# Patient Record
Sex: FEMALE | Employment: OTHER | URBAN - METROPOLITAN AREA
[De-identification: names, ages, dates, MRNs, and addresses within clinical notes are randomized per-mention and may not be internally consistent; named-entity substitution may affect disease eponyms.]

---

## 2019-02-21 ENCOUNTER — OFFICE VISIT (OUTPATIENT)
Dept: CARDIOLOGY CLINIC | Age: 69
End: 2019-02-21

## 2019-02-21 VITALS
HEIGHT: 63 IN | HEART RATE: 76 BPM | RESPIRATION RATE: 16 BRPM | BODY MASS INDEX: 37.6 KG/M2 | DIASTOLIC BLOOD PRESSURE: 80 MMHG | WEIGHT: 212.2 LBS | SYSTOLIC BLOOD PRESSURE: 142 MMHG

## 2019-02-21 DIAGNOSIS — R00.2 HEART PALPITATIONS: ICD-10-CM

## 2019-02-21 DIAGNOSIS — I48.0 PAROXYSMAL A-FIB (HCC): Primary | ICD-10-CM

## 2019-02-21 DIAGNOSIS — R01.1 CARDIAC MURMUR: ICD-10-CM

## 2019-02-21 PROBLEM — E66.01 SEVERE OBESITY (HCC): Status: ACTIVE | Noted: 2019-02-21

## 2019-02-21 RX ORDER — LEVOTHYROXINE SODIUM 100 UG/1
100 TABLET ORAL
COMMUNITY
End: 2019-09-20 | Stop reason: SDUPTHER

## 2019-02-21 RX ORDER — VITAMIN E 268 MG
CAPSULE ORAL DAILY
COMMUNITY

## 2019-02-21 RX ORDER — METOPROLOL SUCCINATE 25 MG/1
TABLET, EXTENDED RELEASE ORAL DAILY
COMMUNITY
End: 2019-02-21 | Stop reason: SDUPTHER

## 2019-02-21 RX ORDER — METOPROLOL SUCCINATE 25 MG/1
25 TABLET, EXTENDED RELEASE ORAL DAILY
Qty: 30 TAB | Refills: 11 | Status: SHIPPED | OUTPATIENT
Start: 2019-02-21 | End: 2019-09-16

## 2019-02-21 RX ORDER — BISMUTH SUBSALICYLATE 262 MG
1 TABLET,CHEWABLE ORAL DAILY
COMMUNITY

## 2019-02-21 RX ORDER — MELATONIN
DAILY
COMMUNITY
End: 2019-09-16

## 2019-02-21 NOTE — LETTER
2/21/2019 5:53 PM 
 
Patient:  Crow Villafuerte YOB: 1950 Date of Visit: 2/21/2019 Dear MD Juan Parr [id number] [facility] [place] [id number] VIA Facsimile: [phone] 
 : 
 
Ms. Adan Ervin is a 76 yo F with family history of early coronary artery disease, second hand tobacco exposure, referred by Dr. Rod Del Rio for cardiac evaluation. She is here due to recently diagnosed atrial fibrillation. On 02/04/2019, she saw her primary care physician because she had been feeling bad and on physical exam noted she had an irregular heart rhythm. Subsequent EKG showed atrial fibrillation. She had been taking Mucinex DM and thinks that may have caused the problem. Since then, she has not had any recurrent palpitations. She denies any prior palpitations. No prior cardiac history. She denies any chest pain, shortness of breath, lightheadedness, dizziness. She was told she had a heart murmur in the past, but notes at times they have said that she did not have one. She had a repeat EKG when she saw her primary care physician on 02/13/2019 that was normal sinus rhythm. She does admit to a lot of hesitancy in taking any type of medication. She is compensated on exam with clear lungs. She does have a I-II/VI systolic murmur at the left upper sternal border and she is in a regular rhythm today. Her blood pressure is 142/80 with a heart rate of 76. Usually, her blood pressure is normal per the patient. Soc hx. No tobacco; second hand tobacco growing up Fam hx. Dad with CAD 62s, emphysema. Assessment and Plan: 1. Paroxysmal atrial fibrillation. She has a lot of hesitancy about taking anything and per symptoms, this may have occurred just once, but talked about the natural history of this that it recurs. Will obtain an echocardiogram.  Recommended taking Toprol 25 mg for rate control.   Her CHADS VASC score is 2 and recommended taking Eliquis for oral anticoagulation. Talked about the benefits and risks and she will think about this. I gave her a prescription for both Toprol and Eliquis. Tentatively, have her follow back in a month to reassess how she is doing and review her echocardiogram.   
2. Family history of early coronary artery disease. 3. History of secondary tobacco exposure. 4. Cardiac murmur. Echocardiogram as noted above. If you have questions, please do not hesitate to call me. Sincerely, Augusta Myers MD

## 2019-02-21 NOTE — PROGRESS NOTES
[place]: [place]  ([phone]    History of Present Illness:   Ms. Jett Flannery is a 76 yo F with family history of early coronary artery disease, second hand tobacco exposure, referred by Dr. Clem Wright for cardiac evaluation. She is here due to recently diagnosed atrial fibrillation. On 02/04/2019, she saw her primary care physician because she had been feeling bad and on physical exam noted she had an irregular heart rhythm. Subsequent EKG showed atrial fibrillation. She had been taking Mucinex DM and thinks that may have caused the problem. Since then, she has not had any recurrent palpitations. She denies any prior palpitations. No prior cardiac history. She denies any chest pain, shortness of breath, lightheadedness, dizziness. She was told she had a heart murmur in the past, but notes at times they have said that she did not have one. She had a repeat EKG when she saw her primary care physician on 02/13/2019 that was normal sinus rhythm. She does admit to a lot of hesitancy in taking any type of medication. She is compensated on exam with clear lungs. She does have a I-II/VI systolic murmur at the left upper sternal border and she is in a regular rhythm today. Her blood pressure is 142/80 with a heart rate of 76. Usually, her blood pressure is normal per the patient. Soc hx. No tobacco; second hand tobacco growing up  Fam hx. Dad with CAD 62s, emphysema. Assessment and Plan:   1. Paroxysmal atrial fibrillation. She has a lot of hesitancy about taking anything and per symptoms, this may have occurred just once, but talked about the natural history of this that it recurs. Will obtain an echocardiogram.  Recommended taking Toprol 25 mg for rate control. Her CHADS VASC score is 2 and recommended taking Eliquis for oral anticoagulation. Talked about the benefits and risks and she will think about this. I gave her a prescription for both Toprol and Eliquis.   Tentatively, have her follow back in a month to reassess how she is doing and review her echocardiogram.    2. Family history of early coronary artery disease. 3. History of secondary tobacco exposure. 4. Cardiac murmur. Echocardiogram as noted above. She  has no past medical history on file. All other systems negative except as above. PE  Vitals:    02/21/19 1142   BP: 142/80   Pulse: 76   Resp: 16   Weight: 212 lb 3.2 oz (96.3 kg)   Height: 5' 3\" (1.6 m)    Body mass index is 37.59 kg/m². General appearance - alert, well appearing, and in no distress  Mental status - affect appropriate to mood  Eyes - sclera anicteric, moist mucous membranes  Neck - supple, no JVD  Chest - clear to auscultation, no wheezes, rales or rhonchi  Heart - normal rate, regular rhythm, normal S1, S2, no murmurs, rubs, clicks or gallops  Abdomen - soft, nontender, nondistended, no masses or organomegaly  Neurological - no focal deficit  Extremities - peripheral pulses normal, no pedal edema      Recent Labs:  No results found for: CHOL, CHOLX, CHLST, CHOLV, 124728, HDL, LDL, LDLC, DLDLP, TGLX, TRIGL, TRIGP, CHHD, CHHDX  No results found for: EMILIA, CREAPOC, ACREA, CREA, REFC3, REFC4  No results found for: BUN, BUNPOC, IBUN, MBUNV, BUNV  No results found for: K, KI, PLK, WBK  No results found for: HBA1C, HGBE8, ZZO1OFRB, RQO5IGTO  No results found for: HGBPOC, HGB, HGBP, HGBEXT, HGBEXT  No results found for: PLT, PLTEXT, PLTEXT    Reviewed:  No past medical history on file. Social History     Tobacco Use   Smoking Status Never Smoker   Smokeless Tobacco Never Used     Social History     Substance and Sexual Activity   Alcohol Use No    Frequency: Never     Allergies   Allergen Reactions    Bactrim [Sulfamethoprim] Swelling    Phenazopyridine Unknown (comments)    Tape [Adhesive] Other (comments)       Current Outpatient Medications   Medication Sig    levothyroxine (SYNTHROID) 100 mcg tablet Take  by mouth Daily (before breakfast).     multivitamin (ONE A DAY) tablet Take 1 Tab by mouth daily.  cholecalciferol (VITAMIN D3) 1,000 unit tablet Take  by mouth daily.  vitamin E (AQUA GEMS) 400 unit capsule Take  by mouth daily.  Lactobacillus acidophilus (PROBIOTIC PO) Take  by mouth daily.  OTHER Folate w/Metafolin L-5: 400mcg  Take one daily on Monday, Thursday and Friday.  OTHER Aloe Vera Juice  6 ounces or more per day. No current facility-administered medications for this visit.         Nancy Syed MD  [facility] heart and V[facility] [place]  [address] [facility] [address]  [address]

## 2019-02-21 NOTE — PROGRESS NOTES
Visit Vitals  /80 (BP 1 Location: Left arm)   Pulse 76   Resp 16   Ht 5' 3\" (1.6 m)   Wt 212 lb 3.2 oz (96.3 kg)   BMI 37.59 kg/m²

## 2019-03-08 ENCOUNTER — TELEPHONE (OUTPATIENT)
Dept: CARDIOLOGY CLINIC | Age: 69
End: 2019-03-08

## 2019-03-08 NOTE — TELEPHONE ENCOUNTER
----- Message from Caryl Jaimes MD sent at 3/2/2019  4:22 PM EST -----  Please let pt know echo was overall ok. Normal EF, mild to moderate dilated left atrium which is often seen with afib. Keep recommendation from office of toprol and anticoagulation, f/u 1 month. Did she decide whether or not to start meds? thx  ----- Message -----  From: Reagan Espinoza MD  Sent: 3/2/2019   4:21 PM  To: Caryl Jaimes MD    [facility] for patient to return call at earliest convenience. I spoke to patient and above echo results given. She has not started medication but she will keep her follow up appt. To discuss further.   2 pt identifiers used    Future Appointments   Date Time Provider Denisse Colin   3/21/2019  9:00 AM Reagan Espinoza  [address]

## 2019-09-16 ENCOUNTER — OFFICE VISIT (OUTPATIENT)
Dept: ENDOCRINOLOGY | Age: 69
End: 2019-09-16

## 2019-09-16 VITALS
HEART RATE: 72 BPM | DIASTOLIC BLOOD PRESSURE: 73 MMHG | BODY MASS INDEX: 36.71 KG/M2 | HEIGHT: 63 IN | WEIGHT: 207.2 LBS | SYSTOLIC BLOOD PRESSURE: 144 MMHG

## 2019-09-16 DIAGNOSIS — E89.0 POST-SURGICAL HYPOTHYROIDISM: Primary | ICD-10-CM

## 2019-09-16 RX ORDER — ERGOCALCIFEROL 1.25 MG/1
50000 CAPSULE ORAL
COMMUNITY
End: 2019-09-16

## 2019-09-16 NOTE — PATIENT INSTRUCTIONS
1) I will repeat your thyroid tests to see if you need a change in your dose of synthroid. I will target a TSH of 0.5-2.0 and your last value was 2.05 in 6/19 which was just over 2 and could mean your dose of synthroid is not enough. 2) Sign up for [employer] using the access code on your sheet. If you want you can download the Flow[employer] brennen from the brennen store (CircleBuilderphone) or Google play store (android) (make sure you allow locations and choose the [facility] portal and choose to receive notifications) so you can communicate with me through the patient portal.  I will send you a message with your lab results. 3) Once we pick a dose, I can send refills to [facility] and we'll repeat your labs in 2 months and prior to visit in 6 months.

## 2019-09-16 NOTE — PROGRESS NOTES
Chief Complaint   Patient presents with    Thyroid Problem     pcp and pharmacy confirmed     History of Present Illness: Whit Gipson is a 77 y.o. female who is a new patient for thyroid referred by Dr. Marely Carrillo. Recalls in her 1980s her GYN telling her that her thyroid was enlarged and was sent to Dr. Helen Huggins and was watched for a year and apparently her thyroid was enlarging so the decision was made to remove it. Thinks her thyroid was overactive prior to the surgery as she was very thin at that time and had lots of energy but was never put on any meds for hyperthyroidism. The pathology was benign and was put on medication after the surgery and was seen by Dr. Michelle Araya and was managed with synthroid the whole time. About 7 years ago was told by her nutrition company to do a trial of natural thyroid and recalls taking armour thyroid and very dry mouth while on this so switched to nature-throid and tolerated this better and stayed on for about 2 years. Recalls being put on cytomel at that point and developed a lot of tremors and then went back to see her PCP, Dr. Arti Santana, and was put back on synthroid 88 mcg daily and then started seeing Dr. Lam President and her dose was increased to 100 mcg daily and has remained on this dose since Nov 2018. TSH was 1.86 in 11/18 and 1.08 in 1/19 and 2.05 in 6/19 and FT4 was 1.46 in 6/19. Last saw the NP in that office in June 2019. Takes the synthroid first thing in the morning with just water and waits at least 30 minutes before eating and coffee. Vitamins are in the afternoon. Never misses any doses and uses a pill box for compliance. Has had more trouble with bloating and was found to have a hiatal hernia and will be seeing a GI doctor tomorrow. Bowels are regular. No hair loss or brittle nails. Does have some dry scalp. Mostly stays comfortable but doesn't like the heat. Overall energy is 6-8/10.   Has irregular sleep patterns due to trying to take care of things around the house when not watching her grandson. Weight was up to almost 300 lbs in  and got down to 170 lbs in  with changing her diet and using some supplements but did get back up to 212 lbs in  and down 5 lbs since then. Past Medical History:   Diagnosis Date    A-fib ([address] 2019    induced by mucinex    Kidney stone     Post-surgical hypothyroidism      Past Surgical History:   Procedure Laterality Date    HX BLEPHAROPLASTY Bilateral     HX BREAST BIOPSY Right     benign    HX BUNIONECTOMY Right     and hammer toe    HX  SECTION      x2 in  and [facility]    HX LITHOTRIPSY  1980s    also had in 2011 x2    HX THYROIDECTOMY      benign nodular goiter    HX TONSIL AND ADENOIDECTOMY      HX TUBAL LIGATION       Current Outpatient Medications   Medication Sig    cholecalciferol, vitamin D3, (VITAMIN D3 PO) Take 10,000 Int'l Units by mouth daily.  ascorbic acid (VITAMIN C PO) Take 500 mg by mouth daily.  ZINC PICOLINATE PO Take 50 mg by mouth daily.  levothyroxine (SYNTHROID) 100 mcg tablet Take 100 mcg by mouth Daily (before breakfast). BRAND MEDICALLY NECESSARY    multivitamin (ONE A DAY) tablet Take 1 Tab by mouth daily.  vitamin E (AQUA GEMS) 400 unit capsule Take  by mouth daily.  OTHER daily. Folate w/Metafolin L-5: 400mcg    OTHER Aloe Vera Juice  6 ounces or more per day. No current facility-administered medications for this visit.       Allergies   Allergen Reactions    Bactrim [Sulfamethoprim] Swelling    Mucinex [Guaifenesin] Other (comments)     Put her into A-fib    Phenazopyridine Unknown (comments)    Tape [Adhesive] Other (comments)     Family History   Problem Relation Age of Onset   [facility] COPD Mother     Stroke Mother         and needed brain surgery    COPD Father     Heart Disease Father         MI and stents    Cancer Father         bone    Thyroid Disease Sister benign nodular goiter removed    Thyroid Cancer Neg Hx      Social History     Socioeconomic History    Marital status:      Spouse name: Not on file    Number of children: Not on file    Years of education: Not on file    Highest education level: Not on file   Occupational History    Not on file   Social Needs    Financial resource strain: Not on file    Food insecurity:     Worry: Not on file     Inability: Not on file    Transportation needs:     Medical: Not on file     Non-medical: Not on file   Tobacco Use    Smoking status: Never Smoker    Smokeless tobacco: Never Used   Substance and Sexual Activity    Alcohol use: No     Frequency: Never    Drug use: Never    Sexual activity: Not on file   Lifestyle    Physical activity:     Days per week: Not on file     Minutes per session: Not on file    Stress: Not on file   Relationships    Social connections:     Talks on phone: Not on file     Gets together: Not on file     Attends Sabianism service: Not on file     Active member of club or organization: Not on file     Attends meetings of clubs or organizations: Not on file     Relationship status: Not on file    Intimate partner violence:     Fear of current or ex partner: Not on file     Emotionally abused: Not on file     Physically abused: Not on file     Forced sexual activity: Not on file   Other Topics Concern    Not on file   Social History Narrative    Lives in [place] with  of 46 years. Has one son age 39 and one daughter age 39. 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 Likes to be with her friends.        Review of Systems:  - Constitutional Symptoms: no fevers, chills, (+) weight change as above  - Eyes: no blurry vision or double vision  - Cardiovascular: no chest pain or palpitations  - Respiratory: no cough or shortness of breath  - Gastrointestinal: no dysphagia or abdominal pain  - Musculoskeletal: no joint pains or weakness  - Integumentary: no rashes  - Neurological: no numbness, tingling, or headaches  - Psychiatric: no depression or anxiety  - Endocrine: no heat or cold intolerance, no polyuria or polydipsia    Physical Examination:  Blood pressure 144/73, pulse 72, height 5' 3\" (1.6 m), weight 207 lb 3.2 oz (94 kg). - General: pleasant, no distress, good eye contact  - HEENT: no exopthalmos, no periorbital edema, no scleral/conjunctival injection, EOMI, no lid lag or stare  - Neck: supple, well healed inferior neck scar, no palpable thyroid tissue, masses, lymph nodes, or carotid bruits, no supraclavicular or dorsocervical fat pads  - Cardiovascular: regular, normal rate, normal S1 and S2, no murmurs/rubs/gallops   - Respiratory: clear to auscultation bilaterally  - Gastrointestinal: soft, nontender, nondistended, no masses, no hepatosplenomegaly  - Musculoskeletal: no proximal muscle weakness in upper or lower extremities  - Integumentary: no acanthosis nigricans, no abdominal striae, no rashes, no edema  - Neurological: reflexes 2+ at biceps, very mild tremor  - Psychiatric: normal mood and affect    Data Reviewed:   - .labs as above      Assessment/Plan:   1. Post-surgical hypothyroidism: Recalls in her 1980s her GYN telling her that her thyroid was enlarged and was sent to Dr. Georgie Taveras and was watched for a year and apparently her thyroid was enlarging so the decision was made to remove it. Thinks her thyroid was overactive prior to the surgery as she was very thin at that time and had lots of energy but was never put on any meds for hyperthyroidism. The pathology was benign and was put on medication after the surgery and was seen by Dr. Delroy Felix and was managed with synthroid the whole time.   About 7 years ago was told by her nutrition company to do a trial of natural thyroid and recalls taking armour thyroid and very dry mouth while on this so switched to nature-throid and tolerated this better and stayed on for about 2 years. Recalls being put on cytomel at that point and developed a lot of tremors and then went back to see her PCP, Dr. Cheyenne Bustos, and was put back on synthroid 88 mcg daily and then started seeing Dr. Anibal Ramirez and her dose was increased to 100 mcg daily and has remained on this dose since Nov 2018. TSH was 1.86 in 11/18 and 1.08 in 1/19 and 2.05 in 6/19 and FT4 was 1.46 in 6/19. Will target a TSH of 0.5-2.0 and see if she needs a slight dose increase. - cont synthroid 100 mcg daily until labs are back  - check TSH and free T4 today, in 2 months and prior to next visit        Patient Instructions   1) I will repeat your thyroid tests to see if you need a change in your dose of synthroid. I will target a TSH of 0.5-2.0 and your last value was 2.05 in 6/19 which was just over 2 and could mean your dose of synthroid is not enough. 2) Sign up for [employer] using the access code on your sheet. If you want you can download the [employer] brennen from the brennen store (I-phone) or Google play store (android) (make sure you allow locations and choose the [facility] [employer] portal and choose to receive notifications) so you can communicate with me through the patient portal.  I will send you a message with your lab results. 3) Once we pick a dose, I can send refills to [facility] and we'll repeat your labs in 2 months and prior to visit in 6 months. Follow-up and Dispositions    · Return in about 6 months (around 3/16/2020). Copy sent to:   Yoav Hopkins MD as PCP - General (Family Practice)  Kimberly Denton MD (Obstetrics & Gynecology)    Lab follow up: 9/20/19    Sent her the following message in a letter and had Van call her with her lab results:    Resulted Orders   T4, FREE (Collected: 9/16/2019 12:09 PM)   Result Value Ref Range    T4, Free 1.63 0.82 - 1.77 ng/dL    Narrative    Performed at:  [address]  [id number]  : Biju Lin MD, Phone:  [phone]   TSH 3RD GENERATION (Collected: 9/16/2019 12:09 PM)   Result Value Ref Range    TSH 1.660 0.450 - 4.500 uIU/mL    Narrative    Performed at:  [address]  [id number]  : Frances Castorena MD, Phone:  [phone]       TSH is a thyroid test.  Your level is 1.66 which is normal and at goal of 0.5-2.0. This test goes opposite of your thyroid dose and suggests your dose of synthroid is perfect so I will keep your dose the same and send a year of refills to [facility] ([facility]throid direct in [place]). Plan on repeating your labs in 2 months to make sure everything still looks good and again prior to your visit in 6 months. Lab follow up: 11/21/19    Sent her the following message in a letter:    Resulted Orders   T4, FREE (Collected: 11/20/2019  8:47 AM)   Result Value Ref Range    T4, Free 1.54 0.82 - 1.77 ng/dL    Narrative    Performed at:  [address]  [id number]  : Frances Castorena MD, Phone:  [phone]   TSH 3RD GENERATION (Collected: 11/20/2019  8:47 AM)   Result Value Ref Range    TSH 2.150 0.450 - 4.500 uIU/mL    Narrative    Performed at:  [address]  [id number]  : Frances Castorena MD, Phone:  [phone]       TSH is a thyroid test.  Your level is 2.15 which is normal but just above goal of 0.5-2.0. This test goes opposite of your thyroid dose and suggests your dose of synthroid is likely adequate but possibly not enough or you have missed doses in the past 6 weeks or are not taking it properly. Please confirm if you have missed any doses or not in the past 6 weeks. Are you taking on an empty stomach with just water at least 30 min before food and coffee? Are you taking any vitamins within 4 hours of your pill? Are you on any new acid reflux medications or any other new medications?   If you are not taking it properly or have missed doses, then I recommend we focus on proper technique and compliance and not change the dose. As long as you are taking properly and haven't missed any doses and don't have any interfering meds and are feeling well, we can keep the dose the same. However, if are feeling more tired despite taking it properly and not missing doses, then we could try taking 1 tab on Mon-Sat and 1.5 tabs on Sunday to get your TSH back under 2. Let me know if you feel a dose change is needed or not. Addendum: 12/2/19  She called our office back to state she does want to try the 7.5 tabs/week dose so have updated her med list to reflect this change.

## 2019-09-17 LAB
T4 FREE SERPL-MCNC: 1.63 NG/DL (ref 0.82–1.77)
TSH SERPL DL<=0.005 MIU/L-ACNC: 1.66 UIU/ML (ref 0.45–4.5)

## 2019-09-20 ENCOUNTER — TELEPHONE (OUTPATIENT)
Dept: ENDOCRINOLOGY | Age: 69
End: 2019-09-20

## 2019-09-20 RX ORDER — LEVOTHYROXINE SODIUM 100 UG/1
100 TABLET ORAL
Qty: 90 TAB | Refills: 3 | Status: SHIPPED | OUTPATIENT
Start: 2019-09-20 | End: 2019-12-02

## 2019-09-20 NOTE — TELEPHONE ENCOUNTER
----- Message from Jayro Guillen sent at 9/20/2019  1:26 PM EDT -----  Regarding: Dr Jamie Couch Message/    Caller's first and last name:      Reason for call see if lab tests results are back      Callback required yes/no and why:get lab test results       Best contact number(s):[phone]      Details to clarify the request:  Pt would like to know if her lab tests results are back yet from her 9/16/19 visit,, in case she needs to change her mediation     Jayro Guillen

## 2019-09-20 NOTE — TELEPHONE ENCOUNTER
Please let her know Im away at a conference until Monday and will be reviewing her results over the weekend and will be in touch by Monday.

## 2019-09-21 NOTE — TELEPHONE ENCOUNTER
Please call her with her lab results and let her know I sent her the following message in a lab letter:    TSH is a thyroid test.  Your level is 1.66 which is normal and at goal of 0.5-2.0. This test goes opposite of your thyroid dose and suggests your dose of synthroid is perfect so I will keep your dose the same and send a year of refills to [facility] ([facility] direct in [place]). Plan on repeating your labs in 2 months to make sure everything still looks good and again prior to your visit in 6 months.

## 2019-10-23 ENCOUNTER — HOSPITAL ENCOUNTER (OUTPATIENT)
Age: 69
Setting detail: OUTPATIENT SURGERY
Discharge: HOME | End: 2019-10-23
Attending: INTERNAL MEDICINE | Admitting: INTERNAL MEDICINE
Payer: MEDICARE

## 2019-10-23 ENCOUNTER — ANESTHESIA EVENT (OUTPATIENT)
Dept: ENDOSCOPY | Age: 69
End: 2019-10-23
Payer: MEDICARE

## 2019-10-23 ENCOUNTER — ANESTHESIA (OUTPATIENT)
Dept: ENDOSCOPY | Age: 69
End: 2019-10-23
Payer: MEDICARE

## 2019-10-23 VITALS
TEMPERATURE: 97.8 F | HEIGHT: 62 IN | HEART RATE: 68 BPM | SYSTOLIC BLOOD PRESSURE: 140 MMHG | BODY MASS INDEX: 34.96 KG/M2 | DIASTOLIC BLOOD PRESSURE: 74 MMHG | RESPIRATION RATE: 15 BRPM | WEIGHT: 190 LBS | OXYGEN SATURATION: 100 %

## 2019-10-23 PROCEDURE — 77030039825 HC MSK NSL PAP SUPERNO2VA VYRM -B: Performed by: ANESTHESIOLOGY

## 2019-10-23 PROCEDURE — 76060000031 HC ANESTHESIA FIRST 0.5 HR: Performed by: INTERNAL MEDICINE

## 2019-10-23 PROCEDURE — 77030021593 HC FCPS BIOP ENDOSC BSC -A: Performed by: INTERNAL MEDICINE

## 2019-10-23 PROCEDURE — 77030013992 HC SNR POLYP ENDOSC BSC -B: Performed by: INTERNAL MEDICINE

## 2019-10-23 PROCEDURE — C1726 CATH, BAL DIL, NON-VASCULAR: HCPCS | Performed by: INTERNAL MEDICINE

## 2019-10-23 PROCEDURE — 74011000250 HC RX REV CODE- 250: Performed by: NURSE ANESTHETIST, CERTIFIED REGISTERED

## 2019-10-23 PROCEDURE — 76040000019: Performed by: INTERNAL MEDICINE

## 2019-10-23 PROCEDURE — 88305 TISSUE EXAM BY PATHOLOGIST: CPT

## 2019-10-23 PROCEDURE — 77030018712 HC DEV BLLN INFL BSC -B: Performed by: INTERNAL MEDICINE

## 2019-10-23 PROCEDURE — 74011250636 HC RX REV CODE- 250/636: Performed by: NURSE ANESTHETIST, CERTIFIED REGISTERED

## 2019-10-23 RX ORDER — SODIUM CHLORIDE 9 MG/ML
50 INJECTION, SOLUTION INTRAVENOUS CONTINUOUS
Status: DISCONTINUED | OUTPATIENT
Start: 2019-10-23 | End: 2019-10-23 | Stop reason: HOSPADM

## 2019-10-23 RX ORDER — SODIUM CHLORIDE 0.9 % (FLUSH) 0.9 %
5-40 SYRINGE (ML) INJECTION AS NEEDED
Status: DISCONTINUED | OUTPATIENT
Start: 2019-10-23 | End: 2019-10-23 | Stop reason: HOSPADM

## 2019-10-23 RX ORDER — LIDOCAINE HYDROCHLORIDE 20 MG/ML
INJECTION, SOLUTION EPIDURAL; INFILTRATION; INTRACAUDAL; PERINEURAL AS NEEDED
Status: DISCONTINUED | OUTPATIENT
Start: 2019-10-23 | End: 2019-10-23 | Stop reason: HOSPADM

## 2019-10-23 RX ORDER — NALOXONE HYDROCHLORIDE 0.4 MG/ML
0.4 INJECTION, SOLUTION INTRAMUSCULAR; INTRAVENOUS; SUBCUTANEOUS
Status: DISCONTINUED | OUTPATIENT
Start: 2019-10-23 | End: 2019-10-23 | Stop reason: HOSPADM

## 2019-10-23 RX ORDER — FLUMAZENIL 0.1 MG/ML
0.2 INJECTION INTRAVENOUS
Status: DISCONTINUED | OUTPATIENT
Start: 2019-10-23 | End: 2019-10-23 | Stop reason: HOSPADM

## 2019-10-23 RX ORDER — SODIUM CHLORIDE 0.9 % (FLUSH) 0.9 %
5-40 SYRINGE (ML) INJECTION EVERY 8 HOURS
Status: DISCONTINUED | OUTPATIENT
Start: 2019-10-23 | End: 2019-10-23 | Stop reason: HOSPADM

## 2019-10-23 RX ORDER — PROPOFOL 10 MG/ML
INJECTION, EMULSION INTRAVENOUS AS NEEDED
Status: DISCONTINUED | OUTPATIENT
Start: 2019-10-23 | End: 2019-10-23 | Stop reason: HOSPADM

## 2019-10-23 RX ORDER — SODIUM CHLORIDE 9 MG/ML
INJECTION, SOLUTION INTRAVENOUS
Status: DISCONTINUED | OUTPATIENT
Start: 2019-10-23 | End: 2019-10-23 | Stop reason: HOSPADM

## 2019-10-23 RX ORDER — ATROPINE SULFATE 0.1 MG/ML
0.5 INJECTION INTRAVENOUS
Status: DISCONTINUED | OUTPATIENT
Start: 2019-10-23 | End: 2019-10-23 | Stop reason: HOSPADM

## 2019-10-23 RX ORDER — DEXTROMETHORPHAN/PSEUDOEPHED 2.5-7.5/.8
1.2 DROPS ORAL
Status: DISCONTINUED | OUTPATIENT
Start: 2019-10-23 | End: 2019-10-23 | Stop reason: HOSPADM

## 2019-10-23 RX ORDER — EPINEPHRINE 0.1 MG/ML
1 INJECTION INTRACARDIAC; INTRAVENOUS
Status: DISCONTINUED | OUTPATIENT
Start: 2019-10-23 | End: 2019-10-23 | Stop reason: HOSPADM

## 2019-10-23 RX ADMIN — PROPOFOL 50 MG: 10 INJECTION, EMULSION INTRAVENOUS at 09:28

## 2019-10-23 RX ADMIN — PROPOFOL 50 MG: 10 INJECTION, EMULSION INTRAVENOUS at 09:31

## 2019-10-23 RX ADMIN — PROPOFOL 50 MG: 10 INJECTION, EMULSION INTRAVENOUS at 09:19

## 2019-10-23 RX ADMIN — PROPOFOL 100 MG: 10 INJECTION, EMULSION INTRAVENOUS at 09:15

## 2019-10-23 RX ADMIN — PROPOFOL 50 MG: 10 INJECTION, EMULSION INTRAVENOUS at 09:25

## 2019-10-23 RX ADMIN — PROPOFOL 50 MG: 10 INJECTION, EMULSION INTRAVENOUS at 09:22

## 2019-10-23 RX ADMIN — SODIUM CHLORIDE: 900 INJECTION, SOLUTION INTRAVENOUS at 09:11

## 2019-10-23 RX ADMIN — LIDOCAINE HYDROCHLORIDE 40 MG: 20 INJECTION, SOLUTION EPIDURAL; INFILTRATION; INTRACAUDAL; PERINEURAL at 09:15

## 2019-10-23 RX ADMIN — PROPOFOL 50 MG: 10 INJECTION, EMULSION INTRAVENOUS at 09:16

## 2019-10-23 NOTE — ANESTHESIA PREPROCEDURE EVALUATION
Relevant Problems   No relevant active problems       Anesthetic History   No history of anesthetic complications            Review of Systems / Medical History  Patient summary reviewed, nursing notes reviewed and pertinent labs reviewed    Pulmonary        Sleep apnea: No treatment  Undiagnosed apnea         Neuro/Psych   Within defined limits           Cardiovascular  Within defined limits                Exercise tolerance: >4 METS     GI/Hepatic/Renal  Within defined limits              Endo/Other      Hypothyroidism: well controlled  Morbid obesity     Other Findings              Physical Exam    Airway  Mallampati: II  TM Distance: > 6 cm  Neck ROM: normal range of motion   Mouth opening: Normal     Cardiovascular  Regular rate and rhythm,  S1 and S2 normal,  no murmur, click, rub, or gallop             Dental  No notable dental hx       Pulmonary  Breath sounds clear to auscultation               Abdominal  GI exam deferred       Other Findings            Anesthetic Plan    ASA: 2  Anesthesia type: MAC          Induction: Intravenous  Anesthetic plan and risks discussed with: Patient

## 2019-10-23 NOTE — DISCHARGE INSTRUCTIONS
118 [facility].  217 [facility] 7300 [facility], [address]  [address] [place] [address],[address]  [id number]  1950    DISCOMFORT:  Redness at IV site- apply warm compress to area; if redness or soreness persist- contact your physician  There may be a slight amount of blood passed from the rectum  Gaseous discomfort- walking, belching will help relieve any discomfort    DIET:   High fiber diet. GERD diet: avoid fried and fatty foods. peppermint, chocolate, alcohol, coffee, citrus fruits and juices, tomoato products; avoid lying down for 2 to 3 hours after eating.   - however -  remember your colon is empty and a heavy meal will produce gas. Avoid these foods:  vegetables, fried / greasy foods, carbonated drinks for today      ACTIVITY  Spend the remainder of the day resting -  avoid any strenuous activity, then you may resume your normal daily activities   You may not operate a vehicle for 12 hours  You may not  engage in an occupation involving machinery or appliances for rest of today  You may not  drink alcoholic beverages for at least 12 hours  Avoid making any critical decisions for at least 24 hour    CALL M.D. ANY SIGN OF   Increasing pain, nausea, vomiting  Abdominal distension (swelling)  New increased bleeding (oral or rectal)  Fever (chills)  Pain in chest area  Bloody discharge from nose or mouth  Shortness of breath    You may not  take any Advil, Aspirin, Ibuprofen, Motrin, Aleve, or Goodys for 7 days, ONLY  Tylenol as needed for pain. Post procedure diagnosis: 1. hiatal hernia  2. schatzk's ring  3. splenic flexure polyp  4.sigmoid colon polyp  5. divertulosis  6.  internal hemorrhoids  7. melanosis    Patient Education   Patient Education   Patient Education   Patient Education   Patient Education        Hiatal Hernia: Care Instructions  Your Care Instructions  A hiatal hernia occurs when part of the stomach bulges into the chest cavity. A hiatal hernia may allow stomach acid and juices to back up into the esophagus (acid reflux). This can cause a feeling of burning, warmth, heat, or pain behind the breastbone. This feeling may often occur after you eat, soon after you lie down, or when you bend forward, and it may come and go. You also may have a sour taste in your mouth. These symptoms are commonly known as heartburn or reflux. But not all hiatal hernias cause symptoms. Follow-up care is a key part of your treatment and safety. Be sure to make and go to all appointments, and call your doctor if you are having problems. It's also a good idea to know your test results and keep a list of the medicines you take. How can you care for yourself at home? · Take your medicines exactly as prescribed. Call your doctor if you think you are having a problem with your medicine. · Do not take aspirin or other nonsteroidal anti-inflammatory drugs (NSAIDs), such as ibuprofen (Advil, Motrin) or naproxen (Aleve), unless your doctor says it is okay. Ask your doctor what you can take for pain. · Your doctor may recommend over-the-counter medicine. For mild or occasional indigestion, antacids such as Tums, Gaviscon, Maalox, or Mylanta may help. Your doctor also may recommend over-the-counter acid reducers, such as famotidine (Pepcid AC), cimetidine (Tagamet HB), ranitidine (Zantac 75 and Zantac 150), or omeprazole (Prilosec). Read and follow all instructions on the label. If you use these medicines often, talk with your doctor. · Change your eating habits. ? It's best to eat several small meals instead of two or three large meals. ? After you eat, wait 2 to 3 hours before you lie down. Late-night snacks aren't a good idea. ? Chocolate, mint, and alcohol can make heartburn worse. They relax the valve between the esophagus and the stomach. ?  Spicy foods, foods that have a lot of acid (like tomatoes and oranges), and coffee can make heartburn symptoms worse in some people. If your symptoms are worse after you eat a certain food, you may want to stop eating that food to see if your symptoms get better. · Do not smoke or chew tobacco.  · If you get heartburn at night, raise the head of your bed 6 to 8 inches by putting the frame on blocks or placing a foam wedge under the head of your mattress. (Adding extra pillows does not work.)  · Do not wear tight clothing around your middle. · Lose weight if you need to. Losing just 5 to 10 pounds can help. When should you call for help? Call your doctor now or seek immediate medical care if:    · You have new or worse belly pain.     · You are vomiting.    Watch closely for changes in your health, and be sure to contact your doctor if:    · You have new or worse symptoms of indigestion.     · You have trouble or pain swallowing.     · You are losing weight.     · You do not get better as expected. Where can you learn more? Go to http://shanika-tejas.info/. Enter A564 in the search box to learn more about \"Hiatal Hernia: Care Instructions. \"  Current as of: November 7, 2018  Content Version: 12.2  © 3427-3973 [employer]. Care instructions adapted under license by [employer] (which disclaims liability or warranty for this information). If you have questions about a medical condition or this instruction, always ask your healthcare professional. [place] [address] any warranty or liability for your use of this information. Diverticulosis: Care Instructions  Your Care Instructions  In diverticulosis, pouches called diverticula form in the wall of the large intestine (colon). The pouches do not cause any pain or other symptoms. Most people who have diverticulosis do not know they have it. But the pouches sometimes bleed, and if they become infected, they can cause pain and other symptoms.  When this happens, it is called diverticulitis. Diverticula form when pressure pushes the wall of the colon outward at certain weak points. A diet that is too low in fiber can cause diverticula. Follow-up care is a key part of your treatment and safety. Be sure to make and go to all appointments, and call your doctor if you are having problems. It's also a good idea to know your test results and keep a list of the medicines you take. How can you care for yourself at home? · Include fruits, leafy green vegetables, beans, and whole grains in your diet each day. These foods are high in fiber. · Take a fiber supplement, such as Citrucel or Metamucil, every day if needed. Read and follow all instructions on the label. · Drink plenty of fluids, enough so that your urine is light yellow or clear like water. If you have kidney, heart, or liver disease and have to limit fluids, talk with your doctor before you increase the amount of fluids you drink. · Get at least 30 minutes of exercise on most days of the week. Walking is a good choice. You also may want to do other activities, such as running, swimming, cycling, or playing tennis or team sports. · Cut out foods that cause gas, pain, or other symptoms. When should you call for help? Call your doctor now or seek immediate medical care if:    · You have belly pain.     · You pass maroon or very bloody stools.     · You have a fever.     · You have nausea and vomiting.     · You have unusual changes in your bowel movements or abdominal swelling.     · You have burning pain when you urinate.     · You have abnormal vaginal discharge.     · You have shoulder pain.     · You have cramping pain that does not get better when you have a bowel movement or pass gas.     · You pass gas or stool from your urethra while urinating.    Watch closely for changes in your health, and be sure to contact your doctor if you have any problems. Where can you learn more?   Go to http://shanika-tejas.info/. Enter R734 in the search box to learn more about \"Diverticulosis: Care Instructions. \"  Current as of: November 7, 2018  Content Version: 12.2  © 4937-3812 [employer]. Care instructions adapted under license by [employer] (which disclaims liability or warranty for this information). If you have questions about a medical condition or this instruction, always ask your healthcare professional. [address] any warranty or liability for your use of this information. Hemorrhoids: Care Instructions  Your Care Instructions    Hemorrhoids are enlarged veins that develop in the anal canal. Bleeding during bowel movements, itching, swelling, and rectal pain are the most common symptoms. They can be uncomfortable at times, but hemorrhoids rarely are a serious problem. You can treat most hemorrhoids with simple changes to your diet and bowel habits. These changes include eating more fiber and not straining to pass stools. Most hemorrhoids do not need surgery or other treatment unless they are very large and painful or bleed a lot. Follow-up care is a key part of your treatment and safety. Be sure to make and go to all appointments, and call your doctor if you are having problems. It's also a good idea to know your test results and keep a list of the medicines you take. How can you care for yourself at home? · Sit in a few inches of warm water (sitz bath) 3 times a day and after bowel movements. The warm water helps with pain and itching. · Put ice on your anal area several times a day for 10 minutes at a time. Put a thin cloth between the ice and your skin. Follow this by placing a warm, wet towel on the area for another 10 to 20 minutes. · Take pain medicines exactly as directed. ? If the doctor gave you a prescription medicine for pain, take it as prescribed.   ? If you are not taking a prescription pain medicine, ask your doctor if you can take an over-the-counter medicine. · Keep the anal area clean, but be gentle. Use water and a fragrance-free soap, such as [place], or use baby wipes or medicated pads, such as Tucks. · Wear cotton underwear and loose clothing to decrease moisture in the anal area. · Eat more fiber. Include foods such as whole-grain breads and cereals, raw vegetables, raw and dried fruits, and beans. · Drink plenty of fluids, enough so that your urine is light yellow or clear like water. If you have kidney, heart, or liver disease and have to limit fluids, talk with your doctor before you increase the amount of fluids you drink. · Use a stool softener that contains bran or psyllium. You can save money by buying bran or psyllium (available in bulk at most health food stores) and sprinkling it on foods or stirring it into fruit juice. Or you can use a product such as Metamucil or Hydrocil. · Practice healthy bowel habits. ? Go to the bathroom as soon as you have the urge. ? Avoid straining to pass stools. Relax and give yourself time to let things happen naturally. ? Do not hold your breath while passing stools. ? Do not read while sitting on the toilet. Get off the toilet as soon as you have finished. · Take your medicines exactly as prescribed. Call your doctor if you think you are having a problem with your medicine. When should you call for help? Call 911 anytime you think you may need emergency care. For example, call if:    · You pass maroon or very bloody stools.    Call your doctor now or seek immediate medical care if:    · You have increased pain.     · You have increased bleeding.    Watch closely for changes in your health, and be sure to contact your doctor if:    · Your symptoms have not improved after 3 or 4 days. Where can you learn more? Go to http://shanika-tejas.info/.   Enter F228 in the search box to learn more about \"Hemorrhoids: Care Instructions. \"  Current as of: November 7, 2018  Content Version: 12.2  © 0792-2104 [employer]. Care instructions adapted under license by [employer] (which disclaims liability or warranty for this information). If you have questions about a medical condition or this instruction, always ask your healthcare professional. [address] any warranty or liability for your use of this information. Colon Polyps: Care Instructions  Your Care Instructions    Colon polyps are growths in the colon or the rectum. The cause of most colon polyps is not known, and most people who get them do not have any problems. But a certain kind can turn into cancer. For this reason, regular testing for colon polyps is important for people as they get older. It is also important for anyone who has an increased risk for colon cancer. Polyps are usually found through routine colon cancer screening tests. Although most colon polyps are not cancerous, they are usually removed and then tested for cancer. Screening for colon cancer saves lives because the cancer can usually be cured if it is caught early. If you have a polyp that is the type that can turn into cancer, you may need more tests to examine your entire colon. The doctor will remove any other polyps that he or she finds, and you will be tested more often. Follow-up care is a key part of your treatment and safety. Be sure to make and go to all appointments, and call your doctor if you are having problems. It's also a good idea to know your test results and keep a list of the medicines you take. How can you care for yourself at home? Regular exams to look for colon polyps are the best way to prevent polyps from turning into colon cancer. These can include stool tests, sigmoidoscopy, colonoscopy, and CT colonography. Talk with your doctor about a testing schedule that is right for you.   To prevent polyps  There is no home treatment that can prevent colon polyps. But these steps may help lower your risk for cancer. · Stay active. Being active can help you get to and stay at a healthy weight. Try to exercise on most days of the week. Walking is a good choice. · Eat well. Choose a variety of vegetables, fruits, legumes (such as peas and beans), fish, poultry, and whole grains. · Do not smoke. If you need help quitting, talk to your doctor about stop-smoking programs and medicines. These can increase your chances of quitting for good. · If you drink alcohol, limit how much you drink. Limit alcohol to 2 drinks a day for men and 1 drink a day for women. When should you call for help? Call your doctor now or seek immediate medical care if:    · You have severe belly pain.     · Your stools are maroon or very bloody.    Watch closely for changes in your health, and be sure to contact your doctor if:    · You have a fever.     · You have nausea or vomiting.     · You have a change in bowel habits (new constipation or diarrhea).     · Your symptoms get worse or are not improving as expected. Where can you learn more? Go to http://shanika-tejas.info/. Enter 95 458289 in the search box to learn more about \"Colon Polyps: Care Instructions. \"  Current as of: December 19, 2018  Content Version: 12.2  © 6208-1010 [employer]. Care instructions adapted under license by [employer] (which disclaims liability or warranty for this information). If you have questions about a medical condition or this instruction, always ask your healthcare professional. [place] [address] any warranty or liability for your use of this information. Esophageal Dilation: What to Expect at [address] [place] [address]  After you have esophageal dilation, you will stay at the hospital or surgery center for 1 to 2 hours. This will allow the medicine to wear off.  You will be able to go home after your doctor or nurse checks to make sure you are not having any problems. This care sheet gives you a general idea about how long it will take for you to recover. But each person recovers at a different pace. Follow the steps below to get better as quickly as possible. How can you care for yourself at home? Activity    · Rest as much as you need to after you go home.     · You should be able to go back to your usual activities the day after the procedure. Diet    · Follow your doctor's directions for eating after the procedure.     · Drink plenty of fluids (unless your doctor has told you not to). Medicines    · Your doctor will tell you if and when you can restart your medicines. He or she will also give you instructions about taking any new medicines.     · If you take blood thinners, such as warfarin (Coumadin), clopidogrel (Plavix), or aspirin, be sure to talk to your doctor. He or she will tell you if and when to start taking those medicines again. Make sure that you understand exactly what your doctor wants you to do.     · If you have a sore throat the day after the procedure, use an over-the-counter spray to numb your throat. Sucking on throat lozenges and gargling with warm salt water may also help relieve your symptoms. Follow-up care is a key part of your treatment and safety. Be sure to make and go to all appointments, and call your doctor if you are having problems. It's also a good idea to know your test results and keep a list of the medicines you take. When should you call for help? Call 911 anytime you think you may need emergency care. For example, call if:    · You passed out (lost consciousness).     · You have trouble breathing.     · Your stools are maroon or very bloody    Call your doctor now or seek immediate medical care if:    · You have new or worse belly pain.     · You have a fever.     · You have new or more blood in your stools.     · You are sick to your stomach and cannot drink fluids.   · You cannot pass stools or gas.     · You have pain that does not get better after you take pain medicine.    Watch closely for changes in your health, and be sure to contact your doctor if:    · Your throat still hurts after a day or two.     · You do not get better as expected. Where can you learn more? Go to http://shanika-tejsa.info/. Enter T411 in the search box to learn more about \"Esophageal Dilation: What to Expect at Home. \"  Current as of: November 7, 2018  Content Version: 12.2  © 0512-1663 [employer]. Care instructions adapted under license by [employer] (which disclaims liability or warranty for this information). If you have questions about a medical condition or this instruction, always ask your healthcare professional. [address] any warranty or liability for your use of this information. Follow-up Instructions:   Call Dr. Pastor Moya for any questions or problems. we took a biopsy please call the office within 2 weeks to discuss your pathology results.  Telephone # [phone]

## 2019-10-23 NOTE — PROGRESS NOTES

## 2019-10-23 NOTE — H&P
[address] [facility] [address]  217 [facility] 140 [facility], [address]  [phone]                                History and Physical     NAME: Savannah Dempsey   :  1950   MRN:  [id number]     HPI:  The patient was seen and examined. Past Surgical History:   Procedure Laterality Date    HX BLEPHAROPLASTY Bilateral     HX BREAST BIOPSY Right     benign    HX BUNIONECTOMY Right     and hammer toe    HX  SECTION      x2 in  and [facility]    HX LITHOTRIPSY  1980s    also had in 2011 x2    HX THYROIDECTOMY      benign nodular goiter    HX TONSIL AND ADENOIDECTOMY      HX TUBAL LIGATION       Past Medical History:   Diagnosis Date    A-fib ([address] 2019    induced by mucinex    Kidney stone     Post-surgical hypothyroidism      Social History     Tobacco Use    Smoking status: Never Smoker    Smokeless tobacco: Never Used   Substance Use Topics    Alcohol use: No     Frequency: Never    Drug use: Never     Allergies   Allergen Reactions    Bactrim [Sulfamethoprim] Swelling    Mucinex [Guaifenesin] Other (comments)     Put her into A-fib    Phenazopyridine Unknown (comments)    Tape [Adhesive] Other (comments)     Family History   Problem Relation Age of Onset   [facility] COPD Mother     Stroke Mother         and needed brain surgery    COPD Father     Heart Disease Father         MI and stents    Cancer Father         bone    Thyroid Disease Sister         benign nodular goiter removed    Thyroid Cancer Neg Hx      No current facility-administered medications for this encounter. PHYSICAL EXAM:  General: WD, WN. Alert, cooperative, no acute distress    HEENT: NC, Atraumatic. PERRLA, EOMI. Anicteric sclerae. Lungs:  CTA Bilaterally. No Wheezing/Rhonchi/Rales.   Heart:  Regular  rhythm,  No murmur, No Rubs, No Gallops  Abdomen: Soft, Non distended, Non tender.  +Bowel sounds, no HSM  Extremities: No c/c/e  Neurologic:  CN 2-12 gi, Alert and oriented X 3. No acute neurological distress   Psych:   Good insight. Not anxious nor agitated. The heart, lungs and mental status were satisfactory for the administration of MAC sedation and for the procedure.       Mallampati score: 3       Assessment:   · Bloating  · GERD  · Screening colonoscopy    Plan:   · Endoscopic procedure  · MAC sedation

## 2019-10-23 NOTE — PROCEDURES
118 [facility] ([facility]  217 [facility] [address], [address]  [phone]                           Colonoscopy and EGD Procedure Note      Indications:    Screening colonoscopy, bloating, GERD     :  Monica Rivera MD    Staff: Endoscopy [facility]: Lynne Ibarra  Endoscopy RN-1: Robin Riley RN  Endoscopy RN-2: Bob Branham RN     Implants: none    Referring Provider: Miryam Handley MD    Sedation:  MAC anesthesia    Procedure Details:  After informed consent was obtained with all risks and benefits of procedure explained and preoperative exam completed, the patient was taken to the endoscopy suite and placed in the left lateral decubitus position. Upon sequential sedation as per above, a digital rectal exam was performed  And was normal.  The Olympus videocolonoscope  was inserted in the rectum and carefully advanced to the cecum, which was identified by the ileocecal valve and appendiceal orifice. The quality of preparation was good. The colonoscope was slowly withdrawn with careful evaluation between folds. Retroflexion in the rectum was performed and was normal..     Colon Findings:   Rectum: no mucosal lesion appreciated  Grade 1 internal hemorrhoid(s); Sigmoid: Mucosal changes consistent with melanosis were noted;  1  Sessile polyp(s), the largest 7 mm in size;      - Diverticulosis  Descending Colon: Mucosal changes consistent with melanosis were noted. One sessile polyp 3 mm in size was noted at the splenic flexure. Transverse Colon: Mucosal changes consistent with melanosis were noted  Ascending Colon: Mucosal changes consistent with melanosis were noted  Cecum: Mucosal changes consistent with melanosis were noted  Terminal Ileum: not intubated      Following sequential administration of sedation as per above, the [id number] gastroscope was inserted into the mouth and advanced under direct vision to second portion of the duodenum.   A careful inspection was made as the gastroscope was withdrawn, including a retroflexed view of the proximal stomach; findings and interventions are described below. EGD Findings:  Esophagus: Normal mucosa. Small sliding hiatal hernia was noted. A partially obstructing Schatzki's ring was noted at the GE junction. Stomach:normal   Duodenum/jejunum:normal      Therapies:  esophageal dilation with 12-15 mmCRE balloon  1 complete polypectomy was performed in sigmoid colon using hot snare  and the polyp was retrieved   1 complete polypectomy was performed at splenic flexure using cold forceps  and the polyp was retrieved    Complications:   None; patient tolerated the procedure well. Impression:    See Postoperative diagnosis above    Recommendations:  -Await pathology. , -Follow symptoms. , -High fiber diet. GERD diet: avoid fried and fatty foods. peppermint, chocolate, alcohol, coffee, citrus fruits and juices, tomoato products; avoid lying down for 2 to 3 hours after eating., -No NSAIDS  -Repeat colonoscopy in 5 years.     -Resume normal medication(s). -NO aspirin for 7 days     Interventions:  esophageal dilation with 12-15 mmCRE balloon  1 complete polypectomy was performed in sigmoid colon using hot snare  and the polyp was retrieved   1 complete polypectomy was performed at splenic flexure using cold forceps  and the polyp was retrieved    Complications: None. Specimen Removed:    ID Type Source Tests Collected by Time Destination   1 : polyp Preservative Splenic Flexure  Kristine Matos MD 10/23/2019 2773 Pathology   2 : polyp Preservative Sigmoid  Kristine Matos MD 10/23/2019 6721 Pathology       EBL:  None. Discharge Disposition:  Home in the company of a  when able to ambulate.     Marci Cee MD  10/23/2019  9:38 AM

## 2019-10-23 NOTE — ANESTHESIA POSTPROCEDURE EVALUATION
"                                 CHILD ADOLESCENT DISCHARGE SUMMARY     Quita Green attended [facility] Day Treatment program for 21 days.    Admit Date: 2/1/19    Discharge Date: 3/8/19       This is a brief summary.  If you would like additional information, and the parent/guardian has signed a release of information form, to give us permission to release desired information to you, please contact our Health Information Management Department to make a request at [phone]    Diagnosis:  DSM-5 IMPRESSIONS:   PRIMARY:  F41.1, generalized anxiety disorder.   SECONDARY:     A.  F41.0, panic disorder.   B.  F32.1, major depressive disorder, single episode, moderate.     Current Medications:  Current Outpatient Medications   Medication Sig     cholecalciferol (VITAMIN D3 GUMMIES ADULT) 1000 units CHEW Take 2 chew tab by mouth daily     hydrOXYzine (ATARAX) 25 MG tablet Take 1 tablet (25 mg) by mouth every 6 hours as needed for anxiety or other (insomnia)     Omega-3 Fatty Acids (FISH OIL ADULT GUMMIES PO) Take 2 chew tab by mouth daily     sertraline (ZOLOFT) 100 MG tablet Take 2 tablets (200 mg) by mouth daily     traZODone (DESYREL) 50 MG tablet Take 1.5 tablets (75 mg) by mouth At Bedtime     No current facility-administered medications for this encounter.      Facility-Administered Medications Ordered in Other Encounters   Medication     acetaminophen (TYLENOL) tablet 650 mg     benzocaine-menthol (CEPACOL) 15-3.6 MG lozenge 1 lozenge     calcium carbonate (TUMS) chewable tablet 1,000 mg     ibuprofen (ADVIL/MOTRIN) tablet 400 mg       Presenting Problem:  Quita was admitted to [address] for \"increasing anxiety\" that was unmanageable. After she was [facility] to [facility] to assist with managing anxiety and creating an educational plan to return to back to school with accommodations and more anxiety coping interventions. Quita also shared significant distress " Post-Anesthesia Evaluation and Assessment    Patient: Alicia Roblero MRN: [id number]  SSN: [id number]    YOB: 1950  Age: 77 y.o. Sex: female      I have evaluated the patient and they are stable and ready for discharge from the PACU. Cardiovascular Function/Vital Signs  Visit Vitals  /73   Pulse 71   Temp 36 °C (96.8 °F)   Resp 14   Ht 5' 2\" (1.575 m)   Wt 86.2 kg (190 lb)   SpO2 100%   Breastfeeding? No   BMI 34.75 kg/m²       Patient is status post MAC anesthesia for Procedure(s):  COLONOSCOPY  ESOPHAGOGASTRODUODENOSCOPY (EGD)  ESOPHAGEAL DILATION  ENDOSCOPIC POLYPECTOMY. Nausea/Vomiting: None    Postoperative hydration reviewed and adequate. Pain:  Pain Scale 1: Numeric (0 - 10) (10/23/19 0849)  Pain Intensity 1: 0 (10/23/19 0849)   Managed    Neurological Status: At baseline    Mental Status, Level of Consciousness: Alert and  oriented to person, place, and time    Pulmonary Status:   O2 Device: Nasal cannula (10/23/19 0936)   Adequate oxygenation and airway patent    Complications related to anesthesia: None    Post-anesthesia assessment completed. No concerns    Signed By: Cristiane Acosta MD     October 23, 2019              Procedure(s):  COLONOSCOPY  ESOPHAGOGASTRODUODENOSCOPY (EGD)  ESOPHAGEAL DILATION  ENDOSCOPIC POLYPECTOMY. MAC    <BSHSIANPOST>    Vitals Value Taken Time   /75 10/23/2019  9:47 AM   Temp     Pulse 71 10/23/2019  9:49 AM   Resp 18 10/23/2019  9:49 AM   SpO2 100 % 10/23/2019  9:49 AM   Vitals shown include unvalidated device data. "within the family system related to changes and a history of high stress.   Treatment goals while in the program, progress on these goals and effective treatment strategies:   1) Obtain an effective individual therapist that Pt feels is a \"good fit\" prior to discharge date.  This writer made multiple attempts to provide parent and patient with referrals for therapists and encouraged them to have an intake prior to discharge. Due to circumstance within the family system, this is not completed prior to discharge. This writer will follow up with family post-discharge to recommend scheduling an intake.   2) Participate in family therapy sessions to discuss how the family system can regulate emotions and create healthy emotional boundaries, 1x per week.  At the time is discharge, Quita reported feeling an improvement with the boundaries between the parent-child dyad and in family sessions discussed with parent about boundaries concerning specific topics (refraining from talking to Quita about custody issues etc.) One of the focuses in therapy was helping Quita create emotional boundaries around herself so that she could be healthy, even if other family members present with mental health symptoms. Quita appeared to have an increased internal locus of control and empowerment.   3) Pt will 1x per day practice a learned DBT or CBT skill outside of program and discuss it in group.  Quita was encouraged to use mindfulness, deep breathing, and cognitive strategies for helping manage anxiety. She developed new ways to manage anxiety and re-visited previously effective strategies (exercise, healthy nutrition, and socialization). At the same time, Qiuta appeared to also benefit from identifying and communicating emotions and receiving validation from others.   4) Pt will participate in an exposure to the school setting 3 weeks after start date and process it in group.   Although Quita did not have a transition day 3 weeks after " start, she did have a successful transition day the last week she was in program. She reported that while at school, she was surprised that she did not have any panic attacks and overall managed her anxiety skillfully. At the time of discharge, she reported feeling confident and competent with returning to school, with the accommodations in place.      Continuing concerns:  As Quita finishes her last year of high school and begins the transition to college, it will be important for her to maintain focus on her own treatment and taking steps toward managing anxiety. Historically, it appears that the family system has a strong influence on her mental health symptoms and Quita has shown improvement when working to maintain healthy boundaries within close relationships. (e.g. Expressing that she does not want to be a part of a specific conversation or asserting her own healthy boundaries with others). As the family system intensifies with custody issues, Quita may experience more distress and need more support. Lastly, it will be recommended that Quita continue mental health therapy with either an individual therapist and/or family therapist as she/family is likely to experience emotional changes when transitioning to living away from home to attend college.   Discharge plans:  Quita will return to [employer] on 3/11/19 for full time academics. She now has appropriate accommodations and a manageable plan to address anxiety within the school setting.     She had been referred to [facility] [address] [facility] [place] [address] [phone]    For both individual and family therapy as needed    Quita expressed that she preferred group therapy which was not available in the [place] area at the time of discharge but was referred to [facility] in [facility] #[phone] or [facility] program for DBT [phone] should she choose to pursue that in the  future and be placed on a wait-list.    For medication management Quita will see Eugenie Mello PCP, at [facility] in [place]      Kiko Delatorre  3/11/2019  12:04 PM

## 2019-10-23 NOTE — ROUTINE PROCESS
Yara Lei 
1950 
[id number] Situation: 
Verbal report received from: John Hernandez RN Procedure: Procedure(s): 
COLONOSCOPY 
ESOPHAGOGASTRODUODENOSCOPY (EGD) ESOPHAGEAL DILATION 
ENDOSCOPIC POLYPECTOMY Background: 
 
Preoperative diagnosis: HIATAL HERNIA, BLOATING SYMPTOM Postoperative diagnosis: 1. hiatal hernia 2. schatzk's ring 3. splenic flexure polyp 4.sigmoid colon polyp 5. divertulosis 6. internal hemorrhoids 7. melanosis :  Dr. Sakina Whitney 
Assistant(s): Endoscopy Technician-1: Autumn David Endoscopy RN-1: Rodrigo Thompson RN Endoscopy RN-2: Trinidad Smallwood RN Specimens:  
ID Type Source Tests Collected by Time Destination 1 : polyp Preservative Splenic Flexure  Marsha Jurado MD 10/23/2019 0779 Pathology 2 : polyp Preservative Sigmoid  Marsha Jurado MD 10/23/2019 7795 Pathology H. Pylori  no Assessment: 
Intra-procedure medications Anesthesia gave intra-procedure sedation and medications, see anesthesia flow sheet yes Intravenous fluids: NS@ Howard Nailer Vital signs stable Abdominal assessment: round and soft Recommendation: 
Discharge patient per MD order. Family or Friend Permission to share finding with family or friend yes

## 2019-11-16 DIAGNOSIS — E89.0 POST-SURGICAL HYPOTHYROIDISM: ICD-10-CM

## 2019-11-21 LAB
T4 FREE SERPL-MCNC: 1.54 NG/DL (ref 0.82–1.77)
TSH SERPL DL<=0.005 MIU/L-ACNC: 2.15 UIU/ML (ref 0.45–4.5)

## 2019-11-27 ENCOUNTER — TELEPHONE (OUTPATIENT)
Dept: ENDOCRINOLOGY | Age: 69
End: 2019-11-27

## 2019-11-27 NOTE — TELEPHONE ENCOUNTER
----- Message from Hansa Tadeo sent at 11/27/2019  3:25 PM EST -----  Regarding: Dr. Adriano Braxton Message/Vendor Calls    Caller's first and last name:      Reason for call:Pt is requesting a callback from PCP about her recent letter and Rx.        Callback required yes/no and why:Y      Best contact number(s):[phone]      Details to clarify the request:      Hansa Tadeo

## 2019-12-02 RX ORDER — LEVOTHYROXINE SODIUM 100 UG/1
TABLET ORAL
Qty: 90 TAB | Refills: 3
Start: 2019-12-02 | End: 2020-01-13 | Stop reason: DRUGHIGH

## 2019-12-02 NOTE — TELEPHONE ENCOUNTER
Spoke with pt on Wednesday making her aware that Dr Nita Kam would not be back int he office until Friday. Pt stated that she would start the 1 1/2 tabs on Sunday, but she will wait until ask   Dr Nita Kam about had a few questions to ask regarding the letter she received but stated that the she will wait until Monday to follow up.

## 2019-12-02 NOTE — TELEPHONE ENCOUNTER
Please let her know it often takes 2-3 weeks to see an effect of a dose increase, not 24 hours as the higher dose has to build up into her system. I recommend she stay on the higher dose of 1 tab on Mon-Sat and 1.5 tabs on Sunday and give this a few more weeks. In terms of the other symptoms, I don't think they are related to her thyroid and she should f/u with her PCP if they continue.

## 2019-12-02 NOTE — TELEPHONE ENCOUNTER
Spoke with pt today and she stated that she did not think her questions were thyroid related, but still wanted asked. Pt c/o having redness in legs up to have calf muscles as well as having a stiffness in her neck. She then stated that she is still very fatigue. She stated that she does not believe the new thyroid dose has helped with feeling fatigued. Mrs Rich Lehman went on to say that normally when her thyroid dose changes, she would feel a difference within 24 hrs and that has yet to happen.

## 2020-01-13 ENCOUNTER — TELEPHONE (OUTPATIENT)
Dept: ENDOCRINOLOGY | Age: 70
End: 2020-01-13

## 2020-01-13 RX ORDER — LEVOTHYROXINE SODIUM 112 UG/1
112 TABLET ORAL
Qty: 90 TAB | Refills: 3 | Status: SHIPPED | OUTPATIENT
Start: 2020-01-13 | End: 2020-10-03 | Stop reason: DRUGHIGH

## 2020-01-13 NOTE — TELEPHONE ENCOUNTER
Please let her know if she is still feeling fatigue on the 100 mcg tabs of synthroid taking 1 tab on Mon-Sat and 1.5 tabs on Sunday then I can try increasing her dose to 112 mcg to take 1 tab 7 days a week and sent this to Wesley Kumari now to stay on until she comes back to see me in March.

## 2020-01-13 NOTE — TELEPHONE ENCOUNTER
----- Message from Tj Taylor sent at 1/13/2020  4:29 PM EST -----  Regarding: Dr. Troy Marti first and last name:      Reason for call: Discuss Rx \"Synthroid\"      Callback required yes/no and why: yes      Best contact number(s): [phone]      Details to clarify the request:      Tj Taylor

## 2020-01-13 NOTE — TELEPHONE ENCOUNTER
Patient stated she would like to discuss with Dr. Lorelei Alonso regarding her thyroid medication. She stated that she has one week left before she needs to reorder however she needs to know if she need a new script. She stated that she increased it as noted on sundays  but she still complains of feeling tired.

## 2020-03-02 DIAGNOSIS — E89.0 POST-SURGICAL HYPOTHYROIDISM: ICD-10-CM

## 2020-03-04 LAB
T4 FREE SERPL-MCNC: 1.49 NG/DL (ref 0.82–1.77)
TSH SERPL DL<=0.005 MIU/L-ACNC: 0.81 UIU/ML (ref 0.45–4.5)

## 2020-03-20 ENCOUNTER — TELEPHONE (OUTPATIENT)
Dept: ENDOCRINOLOGY | Age: 70
End: 2020-03-20

## 2020-03-20 NOTE — TELEPHONE ENCOUNTER
Patient cancelled her appointment and was rescheduled for Monday. He stated she really does not want to come on Monday due to the [address] and she keeps her grandson. Patient would like to know if she could be given her lab results.

## 2020-03-20 NOTE — TELEPHONE ENCOUNTER
Pao,  Please let her know I'm happy to do a virtual visit on Monday at the same time she is currently scheduled. Dayday,  Please switch this in the system. Thanks.

## 2020-03-23 ENCOUNTER — OFFICE VISIT (OUTPATIENT)
Dept: ENDOCRINOLOGY | Age: 70
End: 2020-03-23

## 2020-03-23 DIAGNOSIS — E89.0 POST-SURGICAL HYPOTHYROIDISM: Primary | ICD-10-CM

## 2020-03-23 NOTE — PATIENT INSTRUCTIONS
1) TSH is a thyroid test.  Your level is 0.81 which is normal and at goal of 0.5-2.0. This test goes opposite of your thyroid dose and suggests your dose of synthroid is perfect so I will keep your dose the same. 2) I will plan on seeing you back in one year but if you feel you need to have your labs checked sooner, please let me know. 3) Take the enclosed lab slip to repeat your labs prior to your next visit.  
 
4) You will come back to see me on Tuesday March 23rd, 2021 at 10:10am

## 2020-03-23 NOTE — PROGRESS NOTES
Chief Complaint   Patient presents with    Thyroid Problem     pcp and pharmcy confirmed    Other     address confirmed     **THIS IS A VIRTUAL VISIT VIA A TELEPHONE ENCOUNTER. PATIENT AGREED TO HAVE THEIR CARE DELIVERED OVER THE PHONE IN PLACE OF THEIR REGULARLY SCHEDULED OFFICE VISIT**      History of Present Illness: Loida Atkins is a 68 y.o. female here for follow up of thyroid. Has been taking the synthroid 112 mcg daily since 1/20 and has felt better in terms of energy and motivation and brain fog. Bowels are regular. No hair loss or brittle nails and these have improved. Skin is less dry. Weight is 205 lbs on her home scale and is down 2 lbs since her visit with me in 9/19. No cold or heat intolerance. Current Outpatient Medications   Medication Sig    SYNTHROID 112 mcg tablet Take 1 Tab by mouth Daily (before breakfast). BRAND MEDICALLY NECESSARY--Delete 100 mcg dose from profile    cholecalciferol, vitamin D3, (VITAMIN D3 PO) Take 10,000 Int'l Units by mouth daily.  ascorbic acid (VITAMIN C PO) Take 500 mg by mouth daily.  ZINC PICOLINATE PO Take 50 mg by mouth daily.  multivitamin (ONE A DAY) tablet Take 1 Tab by mouth daily.  vitamin E (AQUA GEMS) 400 unit capsule Take  by mouth daily.  OTHER daily. Folate w/Metafolin L-5: 400mcg    OTHER Aloe Vera Juice  6 ounces or more per day. No current facility-administered medications for this visit.       Allergies   Allergen Reactions    Bactrim [Sulfamethoprim] Swelling    Mucinex [Guaifenesin] Other (comments)     Put her into A-fib    Phenazopyridine Unknown (comments)    Tape [Adhesive] Other (comments)     Review of Systems:  - Cardiovascular: no chest pain or palpitations  - Neurological: no tremors  - Integumentary: skin is normal    Physical Examination: NOT PERFORMED DUE TO THIS BEING A VIRTUAL VISIT    Data Reviewed:   Component      Latest Ref Rng & Units 3/3/2020 3/3/2020           8:58 AM  8:58 AM   T4, Free 0.82 - 1.77 ng/dL  1.49   TSH      0.450 - 4.500 uIU/mL 0.811        Assessment/Plan:     1. Post-surgical hypothyroidism: Recalls in her 1980s her GYN telling her that her thyroid was enlarged and was sent to Dr. Se Joya and was watched for a year and apparently her thyroid was enlarging so the decision was made to remove it. Thinks her thyroid was overactive prior to the surgery as she was very thin at that time and had lots of energy but was never put on any meds for hyperthyroidism. The pathology was benign and was put on medication after the surgery and was seen by Dr. Juliana Anne and was managed with synthroid the whole time. About 7 years ago was told by her nutrition company to do a trial of natural thyroid and recalls taking armour thyroid and very dry mouth while on this so switched to nature-throid and tolerated this better and stayed on for about 2 years. Recalls being put on cytomel at that point and developed a lot of tremors and then went back to see her PCP, Dr. Angela Melendez, and was put back on synthroid 88 mcg daily and then started seeing Dr. Susy Hu and her dose was increased to 100 mcg daily and has remained on this dose since Nov 2018. TSH was 1.86 in 11/18 and 1.08 in 1/19 and 2.05 in 6/19 and FT4 was 1.46 in 6/19. TSH 1.66 in 9/19 so kept dose the same. TSH up to 2.15 in 11/19 so increased dose to 7.5 tabs/week and then in 1/20 was still having fatigue so changed to the synthroid 112 mcg dose and TSH 0.81 in 3/20 and feels well so kept dose the same. - cont synthroid 112 mcg daily  - check TSH and free T4 prior to next visit        Patient Instructions   1) TSH is a thyroid test.  Your level is 0.81 which is normal and at goal of 0.5-2.0. This test goes opposite of your thyroid dose and suggests your dose of synthroid is perfect so I will keep your dose the same.     2) I will plan on seeing you back in one year but if you feel you need to have your labs checked sooner, please let me know. 3) Take the enclosed lab slip to repeat your labs prior to your next visit. 4) You will come back to see me on Tuesday March 23rd, 2021 at 10:10am        Follow-up and Dispositions    · Return for 3/23/21 at 10:10am.         We spent 13 minutes of total time together during this virtual visit with a telephone encounter. All questions were answered and a copy of the instructions were sent to her via a letter. Copy sent to:   Elda Hunter MD as PCP - General (Family Practice)  Gabriella Rajan MD (Obstetrics & Gynecology)

## 2020-10-01 ENCOUNTER — APPOINTMENT (OUTPATIENT)
Dept: VASCULAR SURGERY | Age: 70
End: 2020-10-01
Attending: EMERGENCY MEDICINE
Payer: MEDICARE

## 2020-10-01 ENCOUNTER — APPOINTMENT (OUTPATIENT)
Dept: GENERAL RADIOLOGY | Age: 70
End: 2020-10-01
Attending: EMERGENCY MEDICINE
Payer: MEDICARE

## 2020-10-01 ENCOUNTER — HOSPITAL ENCOUNTER (EMERGENCY)
Age: 70
Discharge: HOME | End: 2020-10-01
Attending: EMERGENCY MEDICINE
Payer: MEDICARE

## 2020-10-01 VITALS
TEMPERATURE: 97.8 F | DIASTOLIC BLOOD PRESSURE: 67 MMHG | OXYGEN SATURATION: 99 % | SYSTOLIC BLOOD PRESSURE: 145 MMHG | RESPIRATION RATE: 16 BRPM | HEART RATE: 76 BPM

## 2020-10-01 DIAGNOSIS — R00.2 PALPITATIONS: Primary | ICD-10-CM

## 2020-10-01 DIAGNOSIS — R07.89 ATYPICAL CHEST PAIN: ICD-10-CM

## 2020-10-01 DIAGNOSIS — M79.89 LEFT LEG SWELLING: ICD-10-CM

## 2020-10-01 LAB
ALBUMIN SERPL-MCNC: 3.5 G/DL (ref 3.5–5)
ALBUMIN/GLOB SERPL: 0.9 {RATIO} (ref 1.1–2.2)
ALP SERPL-CCNC: 108 U/L (ref 45–117)
ALT SERPL-CCNC: 24 U/L (ref 12–78)
ANION GAP SERPL CALC-SCNC: 4 MMOL/L (ref 5–15)
AST SERPL-CCNC: 18 U/L (ref 15–37)
ATRIAL RATE: 79 BPM
BASOPHILS # BLD: 0 K/UL (ref 0–0.1)
BASOPHILS NFR BLD: 1 % (ref 0–1)
BILIRUB SERPL-MCNC: 0.5 MG/DL (ref 0.2–1)
BUN SERPL-MCNC: 11 MG/DL (ref 6–20)
BUN/CREAT SERPL: 16 (ref 12–20)
CALCIUM SERPL-MCNC: 9 MG/DL (ref 8.5–10.1)
CALCULATED P AXIS, ECG09: 50 DEGREES
CALCULATED R AXIS, ECG10: 39 DEGREES
CALCULATED T AXIS, ECG11: 37 DEGREES
CHLORIDE SERPL-SCNC: 107 MMOL/L (ref 97–108)
CO2 SERPL-SCNC: 30 MMOL/L (ref 21–32)
COMMENT, HOLDF: NORMAL
CREAT SERPL-MCNC: 0.68 MG/DL (ref 0.55–1.02)
D DIMER PPP FEU-MCNC: 0.52 MG/L FEU (ref 0–0.65)
DIAGNOSIS, 93000: NORMAL
DIFFERENTIAL METHOD BLD: NORMAL
EOSINOPHIL # BLD: 0.2 K/UL (ref 0–0.4)
EOSINOPHIL NFR BLD: 5 % (ref 0–7)
ERYTHROCYTE [DISTWIDTH] IN BLOOD BY AUTOMATED COUNT: 14 % (ref 11.5–14.5)
GLOBULIN SER CALC-MCNC: 3.9 G/DL (ref 2–4)
GLUCOSE SERPL-MCNC: 109 MG/DL (ref 65–100)
HCT VFR BLD AUTO: 44.9 % (ref 35–47)
HGB BLD-MCNC: 14.3 G/DL (ref 11.5–16)
IMM GRANULOCYTES # BLD AUTO: 0 K/UL (ref 0–0.04)
IMM GRANULOCYTES NFR BLD AUTO: 0 % (ref 0–0.5)
LYMPHOCYTES # BLD: 1 K/UL (ref 0.8–3.5)
LYMPHOCYTES NFR BLD: 21 % (ref 12–49)
MCH RBC QN AUTO: 29.3 PG (ref 26–34)
MCHC RBC AUTO-ENTMCNC: 31.8 G/DL (ref 30–36.5)
MCV RBC AUTO: 92 FL (ref 80–99)
MONOCYTES # BLD: 0.5 K/UL (ref 0–1)
MONOCYTES NFR BLD: 10 % (ref 5–13)
NEUTS SEG # BLD: 3 K/UL (ref 1.8–8)
NEUTS SEG NFR BLD: 63 % (ref 32–75)
NRBC # BLD: 0 K/UL (ref 0–0.01)
NRBC BLD-RTO: 0 PER 100 WBC
P-R INTERVAL, ECG05: 158 MS
PLATELET # BLD AUTO: 185 K/UL (ref 150–400)
PMV BLD AUTO: 11.1 FL (ref 8.9–12.9)
POTASSIUM SERPL-SCNC: 4.2 MMOL/L (ref 3.5–5.1)
PROT SERPL-MCNC: 7.4 G/DL (ref 6.4–8.2)
Q-T INTERVAL, ECG07: 398 MS
QRS DURATION, ECG06: 88 MS
QTC CALCULATION (BEZET), ECG08: 456 MS
RBC # BLD AUTO: 4.88 M/UL (ref 3.8–5.2)
SAMPLES BEING HELD,HOLD: NORMAL
SODIUM SERPL-SCNC: 141 MMOL/L (ref 136–145)
TROPONIN I SERPL-MCNC: <0.05 NG/ML
TSH SERPL DL<=0.05 MIU/L-ACNC: 1.59 UIU/ML (ref 0.36–3.74)
VENTRICULAR RATE, ECG03: 79 BPM
WBC # BLD AUTO: 4.7 K/UL (ref 3.6–11)

## 2020-10-01 PROCEDURE — 71046 X-RAY EXAM CHEST 2 VIEWS: CPT

## 2020-10-01 PROCEDURE — 80053 COMPREHEN METABOLIC PANEL: CPT

## 2020-10-01 PROCEDURE — 84484 ASSAY OF TROPONIN QUANT: CPT

## 2020-10-01 PROCEDURE — 93971 EXTREMITY STUDY: CPT

## 2020-10-01 PROCEDURE — 84443 ASSAY THYROID STIM HORMONE: CPT

## 2020-10-01 PROCEDURE — 93005 ELECTROCARDIOGRAM TRACING: CPT

## 2020-10-01 PROCEDURE — 85379 FIBRIN DEGRADATION QUANT: CPT

## 2020-10-01 PROCEDURE — 36415 COLL VENOUS BLD VENIPUNCTURE: CPT

## 2020-10-01 PROCEDURE — 85025 COMPLETE CBC W/AUTO DIFF WBC: CPT

## 2020-10-01 PROCEDURE — 99284 EMERGENCY DEPT VISIT MOD MDM: CPT

## 2020-10-01 NOTE — ED TRIAGE NOTES
Arrives ambulatory with spouse for c/o feeling like her heart is racing x 1 week, worsened last night.  +single episode of Left chest pain few days ago. Elevated BP reading on Tuesday. 132/88 in triage.

## 2020-10-01 NOTE — PROGRESS NOTES
EMR reviewed. Noted TRST. Attempted to visit patient currently receiving doppler. CM will follow for transitions of care needs.

## 2020-10-01 NOTE — DISCHARGE INSTRUCTIONS
Patient Education        Palpitations: Care Instructions  Your Care Instructions     Heart palpitations are the uncomfortable sensation that your heart is beating fast or irregularly. You might feel pounding or fluttering in your chest. It might feel like your heart is skipping a beat. Although palpitations may be caused by a heart problem, they also occur because of stress, fatigue, or use of alcohol, caffeine, or nicotine. Many medicines, including diet pills, antihistamines, decongestants, and some herbal products, can cause heart palpitations. Nearly everyone has palpitations from time to time. Depending on your symptoms, your doctor may need to do more tests to try to find the cause of your palpitations. Follow-up care is a key part of your treatment and safety. Be sure to make and go to all appointments, and call your doctor if you are having problems. It's also a good idea to know your test results and keep a list of the medicines you take. How can you care for yourself at home? · Avoid caffeine, nicotine, and excess alcohol. · Do not take illegal drugs, such as methamphetamines and cocaine. · Do not take weight loss or diet medicines unless you talk with your doctor first.  · Get plenty of sleep. · Do not overeat. · If you have palpitations again, take deep breaths and try to relax. This may slow a racing heart. · If you start to feel lightheaded, lie down to avoid injuries that might result if you pass out and fall down. · Keep a record of your palpitations and bring it to your next doctor's appointment. Write down:  ? The date and time. ? Your pulse. (If your heart is beating fast, it may be hard to count your pulse.)  ? What you were doing when the palpitations started. ? How long the palpitations lasted. ? Any other symptoms. · If an activity causes palpitations, slow down or stop. Talk to your doctor before you do that activity again. · Take your medicines exactly as prescribed.  Call your doctor if you think you are having a problem with your medicine. When should you call for help? Call 911 anytime you think you may need emergency care. For example, call if:    · You passed out (lost consciousness).     · You have symptoms of a heart attack. These may include:  ? Chest pain or pressure, or a strange feeling in the chest.  ? Sweating. ? Shortness of breath. ? Pain, pressure, or a strange feeling in the back, neck, jaw, or upper belly or in one or both shoulders or arms. ? Lightheadedness or sudden weakness. ? A fast or irregular heartbeat. After you call 911, the  may tell you to chew 1 adult-strength or 2 to 4 low-dose aspirin. Wait for an ambulance. Do not try to drive yourself.     · You have symptoms of a stroke. These may include:  ? Sudden numbness, tingling, weakness, or loss of movement in your face, arm, or leg, especially on only one side of your body. ? Sudden vision changes. ? Sudden trouble speaking. ? Sudden confusion or trouble understanding simple statements. ? Sudden problems with walking or balance. ? A sudden, severe headache that is different from past headaches. Call your doctor now or seek immediate medical care if:    · You have heart palpitations and:  ? Are dizzy or lightheaded, or you feel like you may faint. ? Have new or increased shortness of breath. Watch closely for changes in your health, and be sure to contact your doctor if:    · You continue to have heart palpitations. Where can you learn more? Go to http://www.gray.com/  Enter R508 in the search box to learn more about \"Palpitations: Care Instructions. \"  Current as of: December 16, 2019               Content Version: 12.6  © 9179-3186 Healthwise, Incorporated. Care instructions adapted under license by [employer] (which disclaims liability or warranty for this information).  If you have questions about a medical condition or this instruction, always ask your healthcare professional. [place] [address] any warranty or liability for your use of this information. Patient Education        Chest Pain: Care Instructions  Your Care Instructions     There are many things that can cause chest pain. Some are not serious and will get better on their own in a few days. But some kinds of chest pain need more testing and treatment. Your doctor may have recommended a follow-up visit in the next 8 to 12 hours. If you are not getting better, you may need more tests or treatment. Even though your doctor has released you, you still need to watch for any problems. The doctor carefully checked you, but sometimes problems can develop later. If you have new symptoms or if your symptoms do not get better, get medical care right away. If you have worse or different chest pain or pressure that lasts more than 5 minutes or you passed out (lost consciousness), call 911 or seek other emergency help right away. A medical visit is only one step in your treatment. Even if you feel better, you still need to do what your doctor recommends, such as going to all suggested follow-up appointments and taking medicines exactly as directed. This will help you recover and help prevent future problems. How can you care for yourself at home? · Rest until you feel better. · Take your medicine exactly as prescribed. Call your doctor if you think you are having a problem with your medicine. · Do not drive after taking a prescription pain medicine. When should you call for help? Call 911 if:     · You passed out (lost consciousness).     · You have severe difficulty breathing.     · You have symptoms of a heart attack. These may include:  ? Chest pain or pressure, or a strange feeling in your chest.  ? Sweating. ? Shortness of breath. ? Nausea or vomiting.   ? Pain, pressure, or a strange feeling in your back, neck, jaw, or upper belly or in one or both shoulders or arms.  ? Lightheadedness or sudden weakness. ? A fast or irregular heartbeat. After you call 911, the  may tell you to chew 1 adult-strength or 2 to 4 low-dose aspirin. Wait for an ambulance. Do not try to drive yourself. Call your doctor today if:     · You have any trouble breathing.     · Your chest pain gets worse.     · You are dizzy or lightheaded, or you feel like you may faint.     · You are not getting better as expected.     · You are having new or different chest pain. Where can you learn more? Go to http://www.portillo.com/  Enter A120 in the search box to learn more about \"Chest Pain: Care Instructions. \"  Current as of: June 26, 2019               Content Version: 12.6  © 0229-8189 [employer]. Care instructions adapted under license by [employer] (which disclaims liability or warranty for this information). If you have questions about a medical condition or this instruction, always ask your healthcare professional. [address] any warranty or liability for your use of this information.

## 2020-10-01 NOTE — ED PROVIDER NOTES
75-year-old female with history of atrial fibrillation induced by Mucinex, not on anticoagulation, hypothyroidism, and nephrolithiasis started having palpitations with little pains of discomfort on the left side of her chest early this morning. No shortness of breath. She has also had some left leg swelling and a prominent vein there. Her doctor was going to send her to see a vascular surgeon, but she has not yet made an appointment. Patient has not had any fever or cough. She has never had a venous thromboembolism in the past.  No recent surgery. Patient says she has been gaining weight lately and that's a concern.            Past Medical History:   Diagnosis Date    A-fib ([address] 2019    induced by mucinex    History of colonoscopy 10/2019    Kidney stone     Post-surgical hypothyroidism        Past Surgical History:   Procedure Laterality Date    COLONOSCOPY N/A 10/23/2019    COL[facility] performed [facility] Valdez [facility] ENDOSCOPY    HX BLEPHAROPLASTY Bilateral     HX BREAST BIOPSY Right     benign    HX BUNIONECTOMY Right     and hammer toe    HX  SECTION      x2 in  and [address] HX LITHOTRIPSY  1980s    also had in 2011 x2    HX THYROIDECTOMY      benign nodular goiter    HX TONSIL AND ADENOIDECTOMY      HX TUBAL LIGATION           Family History:   Problem Relation Age of Onset   [facility] COPD Mother     Stroke Mother         and needed brain surgery    COPD Father     Heart Disease Father         MI and stents    Cancer Father         bone    Thyroid Disease Sister         benign nodular goiter removed    Thyroid Cancer Neg Hx        Social History     Socioeconomic History    Marital status:      Spouse name: Not on file    Number of children: Not on file    Years of education: Not on file    Highest education level: Not on file   Occupational History    Not on file   Social Needs    Financial resource strain: Not on file    Food insecurity     Worry: Not on file     Inability: Not on file    Transportation needs     Medical: Not on file     Non-medical: Not on file   Tobacco Use    Smoking status: Never Smoker    Smokeless tobacco: Never Used   Substance and Sexual Activity    Alcohol use: No     Frequency: Never    Drug use: Never    Sexual activity: Not on file   Lifestyle    Physical activity     Days per week: Not on file     Minutes per session: Not on file    Stress: Not on file   Relationships    Social connections     Talks on phone: Not on file     Gets together: Not on file     Attends Nondenominational service: Not on file     Active member of club or organization: Not on file     Attends meetings of clubs or organizations: Not on file     Relationship status: Not on file    Intimate partner violence     Fear of current or ex partner: Not on file     Emotionally abused: Not on file     Physically abused: Not on file     Forced sexual activity: Not on file   Other Topics Concern    Not on file   Social History Narrative    Lives in [facility] with  of 46 years. Has one son age 39 and one daughter age 39. Works in sales in a [employer] and also keeps her 2 yo grandson. Likes to be with her friends. ALLERGIES: Bactrim [sulfamethoprim]; Mucinex [guaifenesin]; Phenazopyridine; and Tape [adhesive]    Review of Systems   Constitutional: Positive for unexpected weight change. Negative for fever. HENT: Negative for trouble swallowing. Eyes: Negative for visual disturbance. Respiratory: Negative for cough. Cardiovascular: Positive for chest pain, palpitations and leg swelling. Gastrointestinal: Negative for abdominal pain. Genitourinary: Positive for frequency. Negative for difficulty urinating. Musculoskeletal: Negative for gait problem. Skin: Negative for rash. Neurological: Negative for headaches. Hematological: Does not bruise/bleed easily. Psychiatric/Behavioral: Negative for sleep disturbance. Vitals:    10/01/20 1028   BP: 132/88   Pulse: 85   Resp: 16   Temp: 97.8 °F (36.6 °C)   SpO2: 97%            Physical Exam  Constitutional:       Appearance: Normal appearance. HENT:      Head: Normocephalic. Nose: Nose normal.      Mouth/Throat:      Mouth: Mucous membranes are moist.   Eyes:      Extraocular Movements: Extraocular movements intact. Conjunctiva/sclera: Conjunctivae normal.   Cardiovascular:      Rate and Rhythm: Normal rate and regular rhythm. Pulmonary:      Effort: Pulmonary effort is normal. No respiratory distress. Abdominal:      General: Abdomen is flat. Palpations: Abdomen is soft. Tenderness: There is no abdominal tenderness. Musculoskeletal: Normal range of motion. Skin:     Findings: No rash. Neurological:      General: No focal deficit present. Mental Status: She is alert. Psychiatric:         Behavior: Behavior normal.          MDM  Number of Diagnoses or Management Options  Diagnosis management comments: EKG at 1052  Normal sinus with normal axis at a rate of 79  MD, QRS, and QTc all within normal limits  No ST changes  Nonischemic    No active chest pain or trouble breathing or palpitations  TSH is within normal limits  No evidence of DVT by ultrasonography of the left leg  Low risk by Anton Rudd for PE and d-dimer was negative when adjusted for her age. I do not believe she has a PE. This does not seem like acute coronary syndrome as she has no chest pain and her EKG is nonischemic. Her troponin is negative. She has no history of coronary artery disease. Heart score is low. She needs to keep track of her blood pressure and follow-up with her primary care doctor. She knows to return to the emergency department immediately should she have any chest pain or trouble breathing or other worrisome symptoms now that the symptoms seem to have resolved.          Procedures

## 2020-10-02 ENCOUNTER — TELEPHONE (OUTPATIENT)
Dept: ENDOCRINOLOGY | Age: 70
End: 2020-10-02

## 2020-10-02 ENCOUNTER — TRANSCRIBE ORDER (OUTPATIENT)
Dept: ENDOCRINOLOGY | Age: 70
End: 2020-10-02

## 2020-10-02 DIAGNOSIS — E89.0 POST-SURGICAL HYPOTHYROIDISM: Primary | ICD-10-CM

## 2020-10-02 NOTE — TELEPHONE ENCOUNTER
----- Message from Donne Sender sent at 10/1/2020  4:48 PM EDT -----  Regarding: MD Avila/ telephone  Patient's first and last name: Mariola Ken  : 1950    Caller's first and last name: pt    Reason for call: Hospital visit    Callback required yes/no and why: yes     Best contact number(s): [phone]    Details to clarify the request: Pt was recently seen at [facility] for headache and thyroid. Pt testing for Thyroid was 1.59. Pt is still experiencing major pain. Pt would like a call to discuss.

## 2020-10-02 NOTE — TELEPHONE ENCOUNTER
----- Message from Jamie Floyd sent at 10/2/2020 11:41 AM EDT -----  Regarding: MD Avila/ telephone  Patient return call    Caller's first and last name and relationship (if not the patient): pt      Best contact number(s):[phone]      Whose call is being returned: Clint Cintron      Details to clarify the request: Pt is returning missed call from practice in reference to hospital visit and thyroid.       Jamie Floyd

## 2020-10-02 NOTE — TELEPHONE ENCOUNTER
Patient stated that she has been experiencing joint pain in her knees, headaches, frequent urination,  Weight gain (now 220 lbs), and feeling very fatigue. She stated that her TSH was 1.39. she would like to know if these symptoms has anything to do with her thyroid? She also stated that she went to the ER yesterday because of irregular heart rates. I informed the patient that Dr. Karina Jimenez is out of the office until Monday and that I could send this message over to another provider. Patient stated that she would like to hear from Dr. Karina Jimenez as soon as possible on Monday morning.

## 2020-10-03 RX ORDER — LEVOTHYROXINE SODIUM 125 UG/1
125 TABLET ORAL
Qty: 90 TAB | Refills: 3 | Status: SHIPPED | OUTPATIENT
Start: 2020-10-03 | End: 2020-10-16 | Stop reason: SDUPTHER

## 2020-10-03 NOTE — TELEPHONE ENCOUNTER
Please let her know that although her TSH was normal at 1.5 it is up from 0.81 in 3/20 and since her weight is up 15 lbs since that time and she previously felt better with a lower TSH, I think it's reasonable to increase her dose of synthroid to see if she feels better on a higher dose of 125 mcg daily. I sent this to [facility] but she can use up the 112 mcg tabs by taking 2 tabs with her next dose and then 1 tab daily after that as 8 tabs/week of 112 mcg = 7 tabs/week of 125 mcg. To be safe, I would like her to repeat her labs in 6-8 weeks on this new dose and put an order directly into the [facility] system to have this done but will mail her a slip that has this expected date on her.

## 2020-10-15 ENCOUNTER — TELEPHONE (OUTPATIENT)
Dept: ENDOCRINOLOGY | Age: 70
End: 2020-10-15

## 2020-10-15 NOTE — TELEPHONE ENCOUNTER
----- Message from Nbaruddy Mehul sent at 10/15/2020  4:05 PM EDT -----  Regarding: Dr Tommy Canales first and last name:      Reason for call:pt said she was started on the new dosage of synthroid  125mg on Monday 10/5/2020 that she split with her rx refill for 112mg  she said it is not working taking it this way,she said doubling up on one day and next six days do one day  its two tabs of the 112 the next six days she does one each of the 112,  she like to suggest one week supply of the 125mg to see if it will work for her that way instead of breaking them up     Callback required yes/no and why:yes for reason given above to discuss other options       Best contact number(s):[phone]      Details to clarify the request:she is having swelling in her legs and pain in her joints also.       Elia Carver

## 2020-10-16 RX ORDER — LEVOTHYROXINE SODIUM 125 UG/1
TABLET ORAL
Qty: 30 TAB | Refills: 0 | Status: SHIPPED | OUTPATIENT
Start: 2020-10-16 | End: 2021-04-16

## 2020-10-16 NOTE — TELEPHONE ENCOUNTER
Patient would like a 30 day supply of levothyroxine 125mcg sent into [facility]. She stated she would like to try a small supply before obtaining the 90 day from [facility]. She is concerned that it may not agree with her because she knows her body. She has been on the 112 mcg dose new instructions per Dr. Daquan Nowak but she does not like the way she has been feeling. She stated she does have other issues going on.

## 2020-10-17 NOTE — TELEPHONE ENCOUNTER
You can let her know I sent a 30 day supply of Synthroid to [facility] and if she is feeling better taking 1 tab 7 days a week than she did taking 8 tabs/week of the 112 mcg dose, then she can call [facility] and have them release the prescription for 125 mcg tabs that was sent to them on 10/3/20.

## 2020-11-14 DIAGNOSIS — E89.0 POST-SURGICAL HYPOTHYROIDISM: ICD-10-CM

## 2020-12-07 ENCOUNTER — TELEPHONE (OUTPATIENT)
Dept: ENDOCRINOLOGY | Age: 70
End: 2020-12-07

## 2020-12-07 NOTE — TELEPHONE ENCOUNTER
----- Message from [facility] sent at 12/7/2020  1:31 PM EST -----  Regarding: Dr. Marizol Garcia Message/Vendor Calls    Caller's first and last name:  N/A      Reason for call:  Remove prescription      Callback required yes/no and why: N      Best contact number(s): [phone]      Details to clarify the request:  Patient is requesting that Dr. Rafita Robertole team call [address] at [phone] to remove the old prescription of 112mcg of Synthroid. The only one that should be active is Synthroid 125mcg, and she is requesting that we double-check the amount of refills she has left. The only way the old prescription of Synthroid 112mcg can be removed is if the doctor's office calls in to the pharmacy to remove it.       [facility]

## 2020-12-07 NOTE — TELEPHONE ENCOUNTER
I called and spoke with Debra Dean with [address] and she stated that the only script on file for Ms. Will Bonilla is for the 125 mcg with 3 refills. lmtrc x1 for Ms. Will Bonilla

## 2020-12-08 NOTE — TELEPHONE ENCOUNTER
----- Message from Chelsea Wilks sent at 12/8/2020  3:19 PM EST -----  Regarding: Dr. Christian Montes Patient return call    Caller's first and last name and relationship (if not the patient):      Best contact number(s): [phone]    Whose call is being returned: Returning missed call from [place].        Details to clarify the request:      Chelsea Wilks

## 2020-12-08 NOTE — TELEPHONE ENCOUNTER
I informed Ms. Dakota Wen that per Troy Felix with [address] the only script they have for her is for the Synthroid 125 mcg. She stated someone informed her that she does have a script for the  112 mcg on file and that we would have to deactivate it. I inform Ms. Dakota Wen again that I spoke with Troy Felix with [address]  and if someone else tell her the same info to have them call me and to write down the name of the person she spoke with. She voiced understanding.

## 2021-03-02 DIAGNOSIS — E89.0 POST-SURGICAL HYPOTHYROIDISM: ICD-10-CM

## 2021-03-18 ENCOUNTER — TELEPHONE (OUTPATIENT)
Dept: ENDOCRINOLOGY | Age: 71
End: 2021-03-18

## 2021-03-18 NOTE — TELEPHONE ENCOUNTER
----- Message from Jacy Borrego sent at 3/18/2021  4:02 PM EDT -----  Regarding: /Telephone  Contact: [phone]  General Message/Vendor Calls    Caller's first and last name:Pt      Reason for call:Pt would like a call back to see if Dr. Evon Jones would order a full panel for blood work. And patient would like a call back to see if there are any sooner appointments for her to reschedule her 3/23 appointment. If not, patient would like to know if she can get her blood work done and just get a call back to discuss her blood work results with Dr. Evon Jones. Callback required yes/no and why:Yes, answer question.        Best contact number(s):[phone]      Details to clarify the request:n/a      Jacy Borrego

## 2021-03-19 NOTE — TELEPHONE ENCOUNTER
I spoke with patient and she stated she will be going to get her labs drawn for Dr. Cheyenne Leslie but she also has an order from her pcp. I informed her if she takes both the orders at the same time then Ted Muñozh will be able to draw for each doctor. I instructed her for Dr. Lucía Obregon order to be sure to give them his name. Patient will keep her virtual appointment and she voiced understanding to the information I gave her.

## 2021-03-19 NOTE — TELEPHONE ENCOUNTER
Please call her back to clarify her request.  What does she want tested? I have an order in the system at [facility] to draw her full thyroid panel. Is she unable to keep her visit on 3/23? I will need some type of visit with her to be able to keep refilling her medication and my preference is either a video visit or an in person visit. A phone call is a last resort only if for a medical reason, she doesn't feel comfortable coming into the office and doesn't have a device with a camera.

## 2021-03-23 ENCOUNTER — TELEPHONE (OUTPATIENT)
Dept: ENDOCRINOLOGY | Age: 71
End: 2021-03-23

## 2021-03-23 LAB — CREATININE, EXTERNAL: 0.59

## 2021-03-23 NOTE — TELEPHONE ENCOUNTER
----- Message from Angela Armstrong sent at 3/23/2021  8:40 AM EDT -----  Regarding: MD Avila/ telephone  Patient's first and last name:    Irena Daniels  : 1950    Caller's first and last name:    pt    Reason for call:   Admitted Patient    Callback required yes/no and why: yes     Best contact number(s):  [phone]    Details to clarify the request: Pt called to cancel upcoming appointment today, 3/23/2021 at 10:10AM. Pt is currently in hospital for possible heart attack.

## 2021-03-26 ENCOUNTER — TELEPHONE (OUTPATIENT)
Dept: ENDOCRINOLOGY | Age: 71
End: 2021-03-26

## 2021-03-26 NOTE — TELEPHONE ENCOUNTER
3/26/2021  4:29 PM    Had to cancel her appt last week b/c she was admitted into the hospital for a heart attack. Was released today. Wants to know if the dosage of her thyroid med needs to be increased. If so she needs a new script. Otherwise says she has enough refills. Also calling concerning a f/u appt.      [phone]    Thanks,  Estela Fagan

## 2021-03-27 NOTE — TELEPHONE ENCOUNTER
Offered her a visit for Monday 3/29 at 3:10pm over [facility] and waiting on her response.

## 2021-03-29 ENCOUNTER — VIRTUAL VISIT (OUTPATIENT)
Dept: ENDOCRINOLOGY | Age: 71
End: 2021-03-29
Payer: MEDICARE

## 2021-03-29 DIAGNOSIS — E89.0 POST-SURGICAL HYPOTHYROIDISM: Primary | ICD-10-CM

## 2021-03-29 PROCEDURE — 3017F COLORECTAL CA SCREEN DOC REV: CPT | Performed by: INTERNAL MEDICINE

## 2021-03-29 PROCEDURE — G8432 DEP SCR NOT DOC, RNG: HCPCS | Performed by: INTERNAL MEDICINE

## 2021-03-29 PROCEDURE — 1090F PRES/ABSN URINE INCON ASSESS: CPT | Performed by: INTERNAL MEDICINE

## 2021-03-29 PROCEDURE — G8427 DOCREV CUR MEDS BY ELIG CLIN: HCPCS | Performed by: INTERNAL MEDICINE

## 2021-03-29 PROCEDURE — 99214 OFFICE O/P EST MOD 30 MIN: CPT | Performed by: INTERNAL MEDICINE

## 2021-03-29 PROCEDURE — 1101F PT FALLS ASSESS-DOCD LE1/YR: CPT | Performed by: INTERNAL MEDICINE

## 2021-03-29 PROCEDURE — G8400 PT W/DXA NO RESULTS DOC: HCPCS | Performed by: INTERNAL MEDICINE

## 2021-03-29 RX ORDER — LISINOPRIL 10 MG/1
10 TABLET ORAL DAILY
COMMUNITY
End: 2021-10-04

## 2021-03-29 RX ORDER — SPIRONOLACTONE 25 MG/1
25 TABLET ORAL DAILY
COMMUNITY
End: 2021-10-04

## 2021-03-29 RX ORDER — GUAIFENESIN 100 MG/5ML
81 LIQUID (ML) ORAL DAILY
COMMUNITY
End: 2021-10-04

## 2021-03-29 RX ORDER — AMIODARONE HYDROCHLORIDE 400 MG/1
400 TABLET ORAL EVERY 8 HOURS
COMMUNITY
End: 2021-04-16

## 2021-03-29 RX ORDER — METOPROLOL TARTRATE 50 MG/1
TABLET ORAL 2 TIMES DAILY
COMMUNITY
End: 2021-10-04

## 2021-03-29 NOTE — PROGRESS NOTES
Chief Complaint   Patient presents with    Thyroid Problem     [email or web address]    Other     pcp and pharmacy confirmed       **THIS IS A VIRTUAL VISIT VIA A VIDEO SYNCHRONOUS DISCUSSION. PATIENT AGREED TO HAVE THEIR CARE DELIVERED OVER A [employer]. ME VIDEO VISIT IN PLACE OF THEIR REGULARLY SCHEDULED OFFICE VISIT**    History of Present Illness: Maylin Celis is a 65 y.o. female here for follow up of thyroid. Last week she was diagnosed with a heart attack at [facility] by Dr. Ankit Garcia and was found to be in a-fib and was started on amiodarone, eliquis, lisinopril, asa, metoprolol, and spironolactone. Was also offered lipitor but she declined this. She had a heart cath that only showed a blockage in a minor vessel that was not stented and no other blockages seen that needed intervention. Weight was 205 lbs in 3/20 up to 219.8 lbs currently. She is hopeful to be weaned off some of these meds in the future. Taking synthroid at least 60 min before any food. Has had some shakes over the past few days with being on all these meds but did not have this prior to her hospital stay. No heat of cold tolerance but does have some cold hands with white finger tips after taking the amiodarone. Current Outpatient Medications   Medication Sig    amiodarone (PACERONE) 400 mg tablet Take 400 mg by mouth every eight (8) hours.  apixaban (ELIQUIS PO) Take  by mouth.  metoprolol tartrate (LOPRESSOR) 50 mg tablet Take  by mouth two (2) times a day.  lisinopriL (PRINIVIL, ZESTRIL) 10 mg tablet Take 10 mg by mouth daily.  aspirin 81 mg chewable tablet Take 81 mg by mouth daily.  spironolactone (ALDACTONE) 25 mg tablet Take 25 mg by mouth daily.  Synthroid 125 mcg tablet BRAND MEDICALLY NECESSARY-    cholecalciferol, vitamin D3, (VITAMIN D3 PO) Take 10,000 Int'l Units by mouth daily.  ascorbic acid (VITAMIN C PO) Take 500 mg by mouth daily.  ZINC PICOLINATE PO Take 50 mg by mouth daily.     multivitamin (ONE A DAY) tablet Take 1 Tab by mouth daily.  vitamin E (AQUA GEMS) 400 unit capsule Take  by mouth daily.  OTHER daily. Folate w/Metafolin L-5: 400mcg    OTHER Aloe Vera Juice  6 ounces or more per day. No current facility-administered medications for this visit. Allergies   Allergen Reactions    Bactrim [Sulfamethoprim] Swelling    Mucinex [Guaifenesin] Other (comments)     Put her into A-fib    Phenazopyridine Unknown (comments)    Tape [Adhesive] Other (comments)     Review of Systems: PER HPI    Physical Examination:  - GENERAL: NCAT, Appears well nourished   - EYES: EOMI, non-icteric, no proptosis   - Ear/Nose/Throat: NCAT, no visible inflammation or masses   - CARDIOVASCULAR: no cyanosis, no visible JVD   - RESPIRATORY: respiratory effort normal without any distress or labored breathing   - MUSCULOSKELETAL: Normal ROM of neck and upper extremities observed   - SKIN: No rash on face  - NEUROLOGIC:  No facial asymmetry (Cranial nerve 7 motor function), No gaze palsy   - PSYCHIATRIC: Normal affect, Normal insight and judgement       Data Reviewed:   - TSH 0.41  - FT4 1.68    Assessment/Plan:     1. Post-surgical hypothyroidism: Recalls in her 1980s her GYN telling her that her thyroid was enlarged and was sent to Dr. Augie Marc and was watched for a year and apparently her thyroid was enlarging so the decision was made to remove it. Thinks her thyroid was overactive prior to the surgery as she was very thin at that time and had lots of energy but was never put on any meds for hyperthyroidism. The pathology was benign and was put on medication after the surgery and was seen by Dr. Guerita Malik and was managed with synthroid the whole time. About 7 years ago was told by her nutrition company to do a trial of natural thyroid and recalls taking armour thyroid and very dry mouth while on this so switched to nature-throid and tolerated this better and stayed on for about 2 years.   Recalls being put on cytomel at that point and developed a lot of tremors and then went back to see her PCP, Dr. Larose, and was put back on synthroid 88 mcg daily and then started seeing Dr. Kurtz and her dose was increased to 100 mcg daily and has remained on this dose since Nov 2018. TSH was 1.86 in 11/18 and 1.08 in 1/19 and 2.05 in 6/19 and FT4 was 1.46 in 6/19. TSH 1.66 in 9/19 so kept dose the same. TSH up to 2.15 in 11/19 so increased dose to 7.5 tabs/week and then in 1/20 was still having fatigue so changed to the synthroid 112 mcg dose and TSH 0.81 in 3/20 and felt well so kept dose the same. TSH 1.59 in 10/20 and increased to 125 mcg daily and TSH slightly low at 0.41 in 3/21 and developed a-fib and was started on amiodarone so decreased to 6.5 tabs/week. Will need to watch her TFTs closely while on amiodarone. - decrease synthroid 125 mcg to 1 tab on Mon-Sat and 1/2 tab on Sun  - check TSH and free T4 in 6 weeks and prior to next visit        Patient Instructions   1) TSH is a thyroid test.  Your level is 0.41 which is slightly low and below goal of 0.45-2.0. This test goes opposite of your thyroid dose and suggests your dose of synthroid is slightly more than you need. I will decrease your dose to 1 tab 6 days a week and 1/2 tab once a week. I updated your med list but did not send a new prescription to your pharmacy on file that has been filling this med. 2) I put an order directly into the [email or web address] system to repeat your labs in 6 weeks and in the 1-2 weeks prior to your next visit so just ask for the order under my name and you will receive a courtesy reminder through [employer] to have these drawn. 3) Please come for a follow up visit on 10/4/21 at 10:10am in our [place] office. Follow-up and Dispositions    · Return 10/4/21 at 10:10am.               Copy sent to:   Papi Ochoa MD as PCP - General (Family Medicine)  Steffani Meigs, MD (Obstetrics & Gynecology)  [address] Faiza Hinojosa MD (Cardiology)    Lab follow up: 5/14/21  Component      Latest Ref Rng & Units 5/13/2021 5/13/2021           8:21 AM  8:21 AM   T4, Free      0.82 - 1.77 ng/dL  2.30 (H)   TSH      0.450 - 4.500 uIU/mL 1.270      Sent her the following message through [employer]:  TSH is a thyroid test.  Your level is 1.27 which is normal and at goal of 0.45-2.0. This test goes opposite of your thyroid dose and suggests your dose of synthroid is perfect so I will keep your dose the same.  -------------------------------------------------------------------------------------------------------------------  Your free T4 was slightly high at 2.30 but I'm not concerned about this as often this will go up if you have taken your thyroid pill within 12 hours of having your labs drawn.

## 2021-03-29 NOTE — PATIENT INSTRUCTIONS
1) TSH is a thyroid test.  Your level is 0.41 which is slightly low and below goal of 0.45-2.0. This test goes opposite of your thyroid dose and suggests your dose of synthroid is slightly more than you need. I will decrease your dose to 1 tab 6 days a week and 1/2 tab once a week. I updated your med list but did not send a new prescription to your pharmacy on file that has been filling this med. 2) I put an order directly into the [email or web address] system to repeat your labs in 6 weeks and in the 1-2 weeks prior to your next visit so just ask for the order under my name and you will receive a courtesy reminder through [employer] to have these drawn. 3) Please come for a follow up visit on 10/4/21 at 10:10am in our [place] office.

## 2021-04-16 ENCOUNTER — TELEPHONE (OUTPATIENT)
Dept: ENDOCRINOLOGY | Age: 71
End: 2021-04-16

## 2021-04-16 RX ORDER — AMIODARONE HYDROCHLORIDE 400 MG/1
400 TABLET ORAL DAILY
COMMUNITY
Start: 2021-04-16 | End: 2021-05-14

## 2021-04-16 RX ORDER — LEVOTHYROXINE SODIUM 125 UG/1
TABLET ORAL
Qty: 30 TAB | Refills: 0
Start: 2021-04-16 | End: 2021-11-16 | Stop reason: SDUPTHER

## 2021-04-16 NOTE — TELEPHONE ENCOUNTER
Patient stated that when she awaken this am and was walking she became very shaky. She stated that her amiodarone has been decreased to one tab daily. She stated that it felt like when your sugar is dropping. She would like to know if it is related to her thyroid and what she should do?

## 2021-04-16 NOTE — TELEPHONE ENCOUNTER
Called and spoke with pt. She states she has been concerned about the amiodarone causing some of her neurologic symptoms including numbness and tingling and some shaking and just had her dose cut back from 3 times daily to once daily last week by her cardiologist.  She has taken 1/2 tab of synthroid on Sun and 1 tab on Mon-Sat since her visit with me. She decided to skip her amiodarone yesterday and today and still had some shaking this morning. I told her that it's unclear if her symptoms could still be from too much synthroid in her system or from the amiodarone so to be safe I will have her take the amiodarone the next 2 days and skip her synthroid the next 2 days and give me an update on Monday with how she is feeling. She may need a different med for a-fib than amiodarone as this can cause the neuro side effects she is describing in up to 10% of patients. she voiced understanding of this plan.

## 2021-04-16 NOTE — TELEPHONE ENCOUNTER
4/16/2021  12:02 PM      Requesting call concerning possible reaction to new thyroid med. Not feeling well. Had heart attack few weeks ago and now taking lots of different meds.     [phone]    Thanks,  Sofia Villa

## 2021-05-10 DIAGNOSIS — E89.0 POST-SURGICAL HYPOTHYROIDISM: ICD-10-CM

## 2021-05-14 LAB
T4 FREE SERPL-MCNC: 2.3 NG/DL (ref 0.82–1.77)
TSH SERPL DL<=0.005 MIU/L-ACNC: 1.27 UIU/ML (ref 0.45–4.5)

## 2021-09-20 DIAGNOSIS — E89.0 POST-SURGICAL HYPOTHYROIDISM: ICD-10-CM

## 2021-09-23 LAB — CREATININE, EXTERNAL: 0.53

## 2021-10-04 ENCOUNTER — VIRTUAL VISIT (OUTPATIENT)
Dept: ENDOCRINOLOGY | Age: 71
End: 2021-10-04
Payer: MEDICARE

## 2021-10-04 DIAGNOSIS — E89.0 POST-SURGICAL HYPOTHYROIDISM: Primary | ICD-10-CM

## 2021-10-04 PROCEDURE — 3017F COLORECTAL CA SCREEN DOC REV: CPT | Performed by: INTERNAL MEDICINE

## 2021-10-04 PROCEDURE — 1090F PRES/ABSN URINE INCON ASSESS: CPT | Performed by: INTERNAL MEDICINE

## 2021-10-04 PROCEDURE — 1101F PT FALLS ASSESS-DOCD LE1/YR: CPT | Performed by: INTERNAL MEDICINE

## 2021-10-04 PROCEDURE — G8432 DEP SCR NOT DOC, RNG: HCPCS | Performed by: INTERNAL MEDICINE

## 2021-10-04 PROCEDURE — G8400 PT W/DXA NO RESULTS DOC: HCPCS | Performed by: INTERNAL MEDICINE

## 2021-10-04 PROCEDURE — G8427 DOCREV CUR MEDS BY ELIG CLIN: HCPCS | Performed by: INTERNAL MEDICINE

## 2021-10-04 PROCEDURE — 99213 OFFICE O/P EST LOW 20 MIN: CPT | Performed by: INTERNAL MEDICINE

## 2021-10-04 RX ORDER — APIXABAN 5 MG/1
TABLET, FILM COATED ORAL
COMMUNITY
Start: 2021-09-10

## 2021-10-04 NOTE — PATIENT INSTRUCTIONS
1) TSH is a thyroid test.  Your level is 0.90 which is normal and at goal of 0.45-2.0. This test goes opposite of your thyroid dose and suggests your dose of synthroid is perfect so I will keep your dose the same. 2) I will plan on seeing you back in one year but if you would like your labs checked in 6 months, then please have your PCP order this. 3) Please come for a follow up visit on 10/3/22 at 10:10am in our [place] office. 4) I put an order directly into the [employer] system to repeat your labs in the 1-2 weeks prior to your next visit so just ask for the order under my name and you will receive a courtesy reminder through [employer] to have these drawn.
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
## 2021-11-16 RX ORDER — LEVOTHYROXINE SODIUM 125 UG/1
TABLET ORAL
Qty: 90 TABLET | Refills: 3 | Status: SHIPPED | OUTPATIENT
Start: 2021-11-16 | End: 2022-09-12

## 2021-11-16 NOTE — TELEPHONE ENCOUNTER
----- Message from Freya Avina sent at 11/16/2021  3:57 PM EST -----  Regarding: Synthroid Prescription  I called today requesting that my Synthroid prescription be moved from [place] [id number] to [facility] on [address]. Just checking to make sure you received my request.  Thank you.

## 2022-01-25 DIAGNOSIS — E89.0 POST-SURGICAL HYPOTHYROIDISM: Primary | ICD-10-CM

## 2022-01-25 RX ORDER — LISINOPRIL 2.5 MG/1
2.5 TABLET ORAL DAILY
COMMUNITY
Start: 2022-01-25 | End: 2022-10-03

## 2022-01-25 RX ORDER — METOPROLOL TARTRATE 25 MG/1
25 TABLET, FILM COATED ORAL 2 TIMES DAILY
COMMUNITY
Start: 2022-01-25 | End: 2022-01-27

## 2022-01-25 RX ORDER — SOTALOL HYDROCHLORIDE 80 MG/1
80 TABLET ORAL 2 TIMES DAILY
COMMUNITY
Start: 2022-01-25 | End: 2022-10-03

## 2022-01-25 RX ORDER — ASPIRIN 81 MG/1
81 TABLET ORAL DAILY
COMMUNITY
Start: 2022-01-25 | End: 2022-10-03

## 2022-01-27 LAB
T4 FREE SERPL-MCNC: 1.63 NG/DL (ref 0.82–1.77)
TSH SERPL-ACNC: 1.81 UIU/ML (ref 0.45–4.5)

## 2022-03-19 PROBLEM — E66.01 SEVERE OBESITY (HCC): Status: ACTIVE | Noted: 2019-02-21

## 2022-06-08 LAB
CREATININE, EXTERNAL: 0.72
HBA1C MFR BLD HPLC: 5.8 %
LDL-C, EXTERNAL: 87

## 2022-09-12 RX ORDER — LEVOTHYROXINE SODIUM 125 UG/1
TABLET ORAL
Qty: 90 TABLET | Refills: 3 | Status: SHIPPED | OUTPATIENT
Start: 2022-09-12 | End: 2022-10-03 | Stop reason: DRUGHIGH

## 2022-09-19 DIAGNOSIS — E89.0 POST-SURGICAL HYPOTHYROIDISM: ICD-10-CM

## 2022-09-21 LAB
T4 FREE SERPL-MCNC: 1.79 NG/DL (ref 0.82–1.77)
TSH SERPL DL<=0.005 MIU/L-ACNC: 0.39 UIU/ML (ref 0.45–4.5)

## 2022-10-03 ENCOUNTER — OFFICE VISIT (OUTPATIENT)
Dept: ENDOCRINOLOGY | Age: 72
End: 2022-10-03
Payer: MEDICARE

## 2022-10-03 VITALS
HEART RATE: 105 BPM | BODY MASS INDEX: 41.99 KG/M2 | WEIGHT: 228.2 LBS | DIASTOLIC BLOOD PRESSURE: 74 MMHG | HEIGHT: 62 IN | SYSTOLIC BLOOD PRESSURE: 136 MMHG

## 2022-10-03 DIAGNOSIS — E89.0 POST-SURGICAL HYPOTHYROIDISM: Primary | ICD-10-CM

## 2022-10-03 PROCEDURE — G8536 NO DOC ELDER MAL SCRN: HCPCS | Performed by: INTERNAL MEDICINE

## 2022-10-03 PROCEDURE — 99214 OFFICE O/P EST MOD 30 MIN: CPT | Performed by: INTERNAL MEDICINE

## 2022-10-03 PROCEDURE — G8427 DOCREV CUR MEDS BY ELIG CLIN: HCPCS | Performed by: INTERNAL MEDICINE

## 2022-10-03 PROCEDURE — 1123F ACP DISCUSS/DSCN MKR DOCD: CPT | Performed by: INTERNAL MEDICINE

## 2022-10-03 PROCEDURE — G8432 DEP SCR NOT DOC, RNG: HCPCS | Performed by: INTERNAL MEDICINE

## 2022-10-03 PROCEDURE — G8417 CALC BMI ABV UP PARAM F/U: HCPCS | Performed by: INTERNAL MEDICINE

## 2022-10-03 PROCEDURE — G8400 PT W/DXA NO RESULTS DOC: HCPCS | Performed by: INTERNAL MEDICINE

## 2022-10-03 PROCEDURE — 1101F PT FALLS ASSESS-DOCD LE1/YR: CPT | Performed by: INTERNAL MEDICINE

## 2022-10-03 PROCEDURE — 3017F COLORECTAL CA SCREEN DOC REV: CPT | Performed by: INTERNAL MEDICINE

## 2022-10-03 PROCEDURE — 1090F PRES/ABSN URINE INCON ASSESS: CPT | Performed by: INTERNAL MEDICINE

## 2022-10-03 RX ORDER — LEVOTHYROXINE SODIUM 112 UG/1
112 TABLET ORAL
Qty: 90 TABLET | Refills: 3 | Status: SHIPPED | OUTPATIENT
Start: 2022-10-03

## 2022-10-03 RX ORDER — FUROSEMIDE 20 MG/1
20 TABLET ORAL DAILY
COMMUNITY

## 2022-10-03 RX ORDER — ZINC GLUCONATE 10 MG
250 LOZENGE ORAL DAILY
COMMUNITY

## 2022-10-03 NOTE — PATIENT INSTRUCTIONS
1) TSH is a thyroid test.  Your level is 0.39 which is slightly low and below goal of 0.45-2.0 and your free T4 is slightly high at 1.79. This test goes opposite of your thyroid dose and suggests your dose of synthroid is too much. I will decrease your dose to 112 mcg tabs to take 1 tab 7 days a week and have sent a new prescription to your local pharmacy. Skip tomorrow only and then on Wed start the lower dose. 2) Plan on repeating your labs in 6 weeks. I will send you a message through [employer] with your lab results. Then go again prior to visit in 6 months.
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
## 2022-11-07 ENCOUNTER — TELEPHONE (OUTPATIENT)
Dept: ENDOCRINOLOGY | Age: 72
End: 2022-11-07

## 2022-11-07 NOTE — TELEPHONE ENCOUNTER
11/7/2022    Pt called and left a vm at 4:17 pm stating Dr. Azalea De La Fuente was going to send her results to her Cardiologist and they have not received it. She will like a call back at [phone] for additional information.     Thanks,   Ruddy To

## 2022-11-08 NOTE — TELEPHONE ENCOUNTER
Patient notified of message via voicemail stating name per Dr. Rhoda Baltazar. Contacted Dr Zee Martinez office for direct fax number. Last note faxed to Dr Anitra Hurtado [phone]. Confirmation received.

## 2022-11-08 NOTE — TELEPHONE ENCOUNTER
I did electronically route my note to Dr. Kassie Fitch on 10/3/22. If his office did not receive this then please call his office and find out if there is a different fax number and you can print off my note and fax it there and let patient know this. Thanks.

## 2022-11-14 DIAGNOSIS — E89.0 POST-SURGICAL HYPOTHYROIDISM: ICD-10-CM

## 2022-11-15 ENCOUNTER — TELEPHONE (OUTPATIENT)
Dept: ENDOCRINOLOGY | Age: 72
End: 2022-11-15

## 2022-11-15 NOTE — TELEPHONE ENCOUNTER
Informed pt that orders for her 6 weeks lab draw have been faxed to the [address] [place] [address] Avenue she requested and a confirmation has been received. Pt states she will have drawn soon for Dr Devon Gaitan.

## 2022-11-15 NOTE — TELEPHONE ENCOUNTER
11/15/2022  12:11 PM      Pt called and staated she  needs her lab orders faxed to [email or web address] fax#[phone]  MY#[phone]      Thanks,  Ashley Cohen

## 2022-11-17 LAB
T4 FREE SERPL-MCNC: 1.83 NG/DL (ref 0.82–1.77)
TSH SERPL DL<=0.005 MIU/L-ACNC: 0.75 UIU/ML (ref 0.45–4.5)

## 2022-11-18 ENCOUNTER — TELEPHONE (OUTPATIENT)
Dept: ENDOCRINOLOGY | Age: 72
End: 2022-11-18

## 2022-11-18 NOTE — TELEPHONE ENCOUNTER
Please let her know I haven't had a chance to review them but will write to her over the weekend with the results on [facility]. Thanks.

## 2022-11-18 NOTE — TELEPHONE ENCOUNTER
11/18/2022  2:32 PM    Pt called and stated she would like to speak with Dr. Ita Fontana regarding her blood work results. Pt can be reached at [phone].     Thanks,   Imelda Ovalles

## 2023-03-20 ENCOUNTER — TELEPHONE (OUTPATIENT)
Dept: ENDOCRINOLOGY | Age: 73
End: 2023-03-20

## 2023-03-20 DIAGNOSIS — E89.0 POST-SURGICAL HYPOTHYROIDISM: Primary | ICD-10-CM

## 2023-03-20 DIAGNOSIS — E89.0 POST-SURGICAL HYPOTHYROIDISM: ICD-10-CM

## 2023-03-20 NOTE — TELEPHONE ENCOUNTER
Please let her know that I put an order into the "[employer] system so she can have her labs drawn on Wednesday next to my office.

## 2023-03-20 NOTE — TELEPHONE ENCOUNTER
Pt LVM at 3:07 PM asking if someone can return her call regarding a lab question. Pt asked if she could have labs drawn at [facility] lab next door to Dr Tiffanie Belle as she has had issues with [facility] and she'd rather use [facility]. Pt states she has to take her  to an appt on Wednesday 03/22/2023 at the [place] location and would like to have labs drawn then.

## 2023-03-21 NOTE — TELEPHONE ENCOUNTER
Patient notified of message per Dr. Tirso Shipman and voiced understanding of what was read to them.

## 2023-03-27 ENCOUNTER — OFFICE VISIT (OUTPATIENT)
Dept: ENDOCRINOLOGY | Age: 73
End: 2023-03-27
Payer: MEDICARE

## 2023-03-27 VITALS
WEIGHT: 213 LBS | HEIGHT: 62 IN | DIASTOLIC BLOOD PRESSURE: 65 MMHG | SYSTOLIC BLOOD PRESSURE: 117 MMHG | BODY MASS INDEX: 39.2 KG/M2 | HEART RATE: 80 BPM

## 2023-03-27 DIAGNOSIS — E89.0 POST-SURGICAL HYPOTHYROIDISM: Primary | ICD-10-CM

## 2023-03-27 PROCEDURE — 3017F COLORECTAL CA SCREEN DOC REV: CPT | Performed by: INTERNAL MEDICINE

## 2023-03-27 PROCEDURE — 1090F PRES/ABSN URINE INCON ASSESS: CPT | Performed by: INTERNAL MEDICINE

## 2023-03-27 PROCEDURE — G8432 DEP SCR NOT DOC, RNG: HCPCS | Performed by: INTERNAL MEDICINE

## 2023-03-27 PROCEDURE — G8417 CALC BMI ABV UP PARAM F/U: HCPCS | Performed by: INTERNAL MEDICINE

## 2023-03-27 PROCEDURE — G8427 DOCREV CUR MEDS BY ELIG CLIN: HCPCS | Performed by: INTERNAL MEDICINE

## 2023-03-27 PROCEDURE — 1123F ACP DISCUSS/DSCN MKR DOCD: CPT | Performed by: INTERNAL MEDICINE

## 2023-03-27 PROCEDURE — G8400 PT W/DXA NO RESULTS DOC: HCPCS | Performed by: INTERNAL MEDICINE

## 2023-03-27 PROCEDURE — G8536 NO DOC ELDER MAL SCRN: HCPCS | Performed by: INTERNAL MEDICINE

## 2023-03-27 PROCEDURE — 1101F PT FALLS ASSESS-DOCD LE1/YR: CPT | Performed by: INTERNAL MEDICINE

## 2023-03-27 PROCEDURE — 99213 OFFICE O/P EST LOW 20 MIN: CPT | Performed by: INTERNAL MEDICINE

## 2023-03-27 NOTE — PATIENT INSTRUCTIONS
1) TSH is a thyroid test.  Your level is 0.63 which is normal and at goal of 0.45-2.0. This test goes opposite of your thyroid dose and suggests your dose of synthroid is perfect so I will keep your dose the same. 2) Your free T4 was slightly high at 1.6 but I'm not concerned about this as often this will go up if you have taken your thyroid pill within 12 hours of having your labs drawn.     3) You can take the lab order to either [facility] or [address] [place] [address] lab next to my office in [facility] 3rd floor [address]

## 2023-03-27 NOTE — PROGRESS NOTES
Chief Complaint   Patient presents with    Thyroid Problem     PCP and pharmacy confirmed      History of Present Illness: Katja Garcia is a 74 y.o. female here for follow up of thyroid. Weight down 15 lbs since last visit in 10/22. She had a pacemaker placed in Jan 2023 along with an a-fib ablation and this went well. Her energy had been better after this was placed and on the lower dose of synthroid. Then developed some shortness of breath and fatigue and cough in 2/23 and went to the ER and had an EKG and CT scan and labs and was told she had fluid on both lungs and was given a z-eleanor and her lasix was increased to 2 tabs daily. Will be seeing Dr. Kenton Mary again for this. Compliant with synthroid 112 mcg daily. Bowel are mostly regular. No heat or cold intolerance. Will be having another CT scan of her lungs by Dr. Sallyanne Hodgkin. Current Outpatient Medications   Medication Sig    furosemide (LASIX) 20 mg tablet Take 20 mg by mouth two (2) times a day. OTHER CBD Rich Hemp oil 25 mg daily    magnesium 250 mg tab Take 250 mg by mouth daily. OTHER Digestive enzymes daily    Synthroid 112 mcg tablet Take 1 Tablet by mouth Daily (before breakfast). BRAND MEDICALLY NECESSARY--Delete 125 mcg dose from profile    Eliquis 5 mg tablet Take 1 tab twice daily    cholecalciferol, vitamin D3, (VITAMIN D3 PO) Take 10,000 Int'l Units by mouth daily. ascorbic acid (VITAMIN C PO) Take 1,000 mg by mouth daily. ZINC PICOLINATE PO Take 50 mg by mouth daily. multivitamin (ONE A DAY) tablet Take 1 Tab by mouth daily. vitamin E (AQUA GEMS) 400 unit capsule Take  by mouth daily. OTHER daily. Folate w/Metafolin L-5: 400mcg    OTHER Aloe Vera Juice  6 ounces or more per day. No current facility-administered medications for this visit.      Allergies   Allergen Reactions    Bactrim [Sulfamethoprim] Swelling    Mucinex [Guaifenesin] Other (comments)     Put her into A-fib    Phenazopyridine Unknown (comments) Tape [Adhesive] Other (comments)     Review of Systems: PER HPI    Physical Examination:  Blood pressure 117/65, pulse 80, height 5' 2\" (1.575 m), weight 213 lb (96.6 kg). General: pleasant, no distress, good eye contact   Neck: no carotid bruits  Cardiovascular: regular, normal rate, nl s1 and s2, no m/r/g   Respiratory: clear to auscultation bilaterally   Integumentary: skin is normal, (+) non-pitting edema left > right  Neurological: reflexes 2+ at biceps, very mild tremor  Psychiatric: normal mood and affect    Data Reviewed:   Component      Latest Ref Rng & Units 3/22/2023 3/22/2023          12:52 PM 12:52 PM   TSH      0.36 - 3.74 uIU/mL  0.63   T4, Free      0.8 - 1.5 NG/DL 1.6 (H)        Assessment/Plan:     1. Post-surgical hypothyroidism: Recalls in her 1980s her GYN telling her that her thyroid was enlarged and was sent to Dr. Blanca Garrison and was watched for a year and apparently her thyroid was enlarging so the decision was made to remove it. Thinks her thyroid was overactive prior to the surgery as she was very thin at that time and had lots of energy but was never put on any meds for hyperthyroidism. The pathology was benign and was put on medication after the surgery and was seen by Dr. Kelsi Johnson and was managed with synthroid the whole time. About 7 years ago was told by her nutrition company to do a trial of natural thyroid and recalls taking armour thyroid and very dry mouth while on this so switched to nature-throid and tolerated this better and stayed on for about 2 years. Recalls being put on cytomel at that point and developed a lot of tremors and then went back to see her PCP, Dr. Ava Kussmaul, and was put back on synthroid 88 mcg daily and then started seeing Dr. Kinza Tubbs and her dose was increased to 100 mcg daily and has remained on this dose since Nov 2018. TSH was 1.86 in 11/18 and 1.08 in 1/19 and 2.05 in 6/19 and FT4 was 1.46 in 6/19. TSH 1.66 in 9/19 so kept dose the same.   TSH up to 2.15 in 11/19 so increased dose to 7.5 tabs/week and then in 1/20 was still having fatigue so changed to the synthroid 112 mcg dose and TSH 0.81 in 3/20 and felt well so kept dose the same. TSH 1.59 in 10/20 and increased to 125 mcg daily and TSH slightly low at 0.41 in 3/21 and developed a-fib and was started on amiodarone so decreased to 6.5 tabs/week. TSH 1.27 in 5/21 and 0.909 in 9/21 and 1.81 in 1/22 and 0.79 in 6/22 so kept dose the same. TSH 0.39 and FT4 1.79 in 9/22 so decreased to 112 mcg daily and TSH 0.75 in 11/22 and 0.63 in 3/23 so kept dose the same. - cont synthroid 112 mcg daily  - check TSH and free T4 prior to next visit        Patient Instructions   1) TSH is a thyroid test.  Your level is 0.63 which is normal and at goal of 0.45-2.0. This test goes opposite of your thyroid dose and suggests your dose of synthroid is perfect so I will keep your dose the same. 2) Your free T4 was slightly high at 1.6 but I'm not concerned about this as often this will go up if you have taken your thyroid pill within 12 hours of having your labs drawn. 3) You can take the lab order to either [facility] or [address] [place] [address] lab next to my office in [facility] 3rd floor [address]        Follow-up and Dispositions    Return in about 6 months (around 9/27/2023). Copy sent to:   Karen Infante MD as PCP - General (Family Medicine)  Drea Lopez MD (Obstetrics & Gynecology)  Linda Yeung MD (Cardiology)  Sage Pinto NP (Nurse Practitioner)  Lucio Alexander MD (Pulmonary Disease)

## 2023-04-07 ENCOUNTER — TELEPHONE (OUTPATIENT)
Dept: ENDOCRINOLOGY | Age: 73
End: 2023-04-07

## 2023-04-23 DIAGNOSIS — E89.0 POST-SURGICAL HYPOTHYROIDISM: Primary | ICD-10-CM

## 2023-04-24 ENCOUNTER — TELEPHONE (OUTPATIENT)
Dept: ENDOCRINOLOGY | Age: 73
End: 2023-04-24

## 2023-04-24 DIAGNOSIS — E89.0 POST-SURGICAL HYPOTHYROIDISM: Primary | ICD-10-CM

## 2023-04-27 RX ORDER — LEVOTHYROXINE SODIUM 112 UG/1
112 TABLET ORAL
Qty: 90 TABLET | Refills: 3 | Status: SHIPPED | OUTPATIENT
Start: 2023-04-27

## 2023-05-04 ENCOUNTER — TELEPHONE (OUTPATIENT)
Dept: ENDOCRINOLOGY | Age: 73
End: 2023-05-04

## 2023-06-28 DIAGNOSIS — E89.0 POSTPROCEDURAL HYPOTHYROIDISM: Primary | ICD-10-CM

## 2023-07-01 LAB
T4 FREE SERPL-MCNC: 1.84 NG/DL (ref 0.82–1.77)
TSH SERPL DL<=0.005 MIU/L-ACNC: 0.25 UIU/ML (ref 0.45–4.5)

## 2023-08-07 ENCOUNTER — TELEPHONE (OUTPATIENT)
Age: 73
End: 2023-08-07

## 2023-08-07 DIAGNOSIS — E89.0 POSTPROCEDURAL HYPOTHYROIDISM: Primary | ICD-10-CM

## 2023-08-07 NOTE — TELEPHONE ENCOUNTER
Pt LVM stating since her last dose change she has felt better after 3 weeks. Now she is saying that the symptoms are returning. Pt states she is extremely tired. Pt asked can she have labs checked again to see if it is her thyroid causes her to be so fatigue.

## 2023-08-08 NOTE — TELEPHONE ENCOUNTER
Patient notified of message per Dr. Noel Nieto and voiced understanding of what was read to them. Pt states she understands and will have labs drawn. Pt states if the labs are off she would like for Dr Noel Nieto to review but she understands if she has to wait until Dr Kristina Cross returns on 08/14/2023.

## 2023-08-09 LAB
T4 FREE SERPL-MCNC: 2.06 NG/DL (ref 0.82–1.77)
TSH SERPL DL<=0.005 MIU/L-ACNC: 0.79 UIU/ML (ref 0.45–4.5)

## 2023-09-15 DIAGNOSIS — E89.0 POST-SURGICAL HYPOTHYROIDISM: ICD-10-CM

## 2023-09-29 DIAGNOSIS — E89.0 POST-SURGICAL HYPOTHYROIDISM: ICD-10-CM

## 2023-09-30 LAB
T4 FREE SERPL-MCNC: 1.4 NG/DL (ref 0.8–1.5)
TSH SERPL DL<=0.05 MIU/L-ACNC: 0.75 UIU/ML (ref 0.36–3.74)

## 2023-10-02 ENCOUNTER — OFFICE VISIT (OUTPATIENT)
Age: 73
End: 2023-10-02
Payer: MEDICARE

## 2023-10-02 VITALS
WEIGHT: 201.6 LBS | BODY MASS INDEX: 37.1 KG/M2 | DIASTOLIC BLOOD PRESSURE: 60 MMHG | HEIGHT: 62 IN | HEART RATE: 78 BPM | SYSTOLIC BLOOD PRESSURE: 90 MMHG

## 2023-10-02 DIAGNOSIS — E89.0 POST-SURGICAL HYPOTHYROIDISM: Primary | ICD-10-CM

## 2023-10-02 PROCEDURE — G8427 DOCREV CUR MEDS BY ELIG CLIN: HCPCS | Performed by: INTERNAL MEDICINE

## 2023-10-02 PROCEDURE — 1090F PRES/ABSN URINE INCON ASSESS: CPT | Performed by: INTERNAL MEDICINE

## 2023-10-02 PROCEDURE — 3017F COLORECTAL CA SCREEN DOC REV: CPT | Performed by: INTERNAL MEDICINE

## 2023-10-02 PROCEDURE — G8400 PT W/DXA NO RESULTS DOC: HCPCS | Performed by: INTERNAL MEDICINE

## 2023-10-02 PROCEDURE — 99213 OFFICE O/P EST LOW 20 MIN: CPT | Performed by: INTERNAL MEDICINE

## 2023-10-02 PROCEDURE — G8417 CALC BMI ABV UP PARAM F/U: HCPCS | Performed by: INTERNAL MEDICINE

## 2023-10-02 PROCEDURE — G8484 FLU IMMUNIZE NO ADMIN: HCPCS | Performed by: INTERNAL MEDICINE

## 2023-10-02 PROCEDURE — 1123F ACP DISCUSS/DSCN MKR DOCD: CPT | Performed by: INTERNAL MEDICINE

## 2023-10-02 PROCEDURE — 1036F TOBACCO NON-USER: CPT | Performed by: INTERNAL MEDICINE

## 2023-10-02 RX ORDER — SACUBITRIL AND VALSARTAN 24; 26 MG/1; MG/1
1 TABLET, FILM COATED ORAL 2 TIMES DAILY
COMMUNITY

## 2023-10-02 RX ORDER — VITAMIN E 268 MG
400 CAPSULE ORAL DAILY
COMMUNITY

## 2023-10-02 RX ORDER — BUMETANIDE 2 MG/1
TABLET ORAL DAILY
COMMUNITY
Start: 2023-09-22

## 2023-10-02 NOTE — PATIENT INSTRUCTIONS
1) TSH is a thyroid test.  Your level is 0.75 which is normal and at goal of 0.45-2.0. The TSH goes opposite of your thyroid dose and suggests your dose of synthroid 6 tabs/week is perfect so I will keep your dose the same. 2) Your blood pressure is lower than I would like so contact Dr. Janet Vargas to ask if you can decrease your bumex to avoid dehydration and dizziness.

## 2023-10-02 NOTE — PROGRESS NOTES
was started on amiodarone so decreased to 6.5 tabs/week. TSH 1.27 in 5/21 and 0.909 in 9/21 and 1.81 in 1/22 and 0.79 in 6/22 so kept dose the same. TSH 0.39 and FT4 1.79 in 9/22 so decreased to 112 mcg daily and TSH 0.75 in 11/22 and 0.63 in 3/23 so kept dose the same. TSH 0.25 and FT4 1.84 in 6/23 so decreased to 6.5 tabs/week and TSH 0.79 and FT4 2.06 in 8/23 so decreased to 6 tabs/week and TSH 0.75 and FT4 1.4 in 9/23 so kept dose the same. - cont synthroid 112 mcg 6 tabs/week  - check TSH and free T4 prior to next visit        Patient Instructions   1) TSH is a thyroid test.  Your level is 0.75 which is normal and at goal of 0.45-2.0. The TSH goes opposite of your thyroid dose and suggests your dose of synthroid 6 tabs/week is perfect so I will keep your dose the same. 2) Your blood pressure is lower than I would like so contact Dr. Natan Giles to ask if you can decrease your bumex to avoid dehydration and dizziness. Return in about 6 months (around 4/2/2024).      Copy sent to:  MD Ani Perez MD (Obstetrics & Gynecology)  Kayla Atkinson MD (Cardiology)  Annie Helton NP (Nurse Practitioner)  Rene Gonzalez MD (Pulmonary Disease)

## 2024-01-10 DIAGNOSIS — E89.0 POST-SURGICAL HYPOTHYROIDISM: Primary | ICD-10-CM

## 2024-01-12 LAB
T4 FREE SERPL-MCNC: 1.57 NG/DL (ref 0.82–1.77)
TSH SERPL DL<=0.005 MIU/L-ACNC: 3.02 UIU/ML (ref 0.45–4.5)

## 2024-03-24 DIAGNOSIS — E89.0 POST-SURGICAL HYPOTHYROIDISM: ICD-10-CM

## 2024-04-04 RX ORDER — LEVOTHYROXINE SODIUM 112 UG/1
TABLET ORAL
Qty: 90 TABLET | Refills: 3 | Status: SHIPPED | OUTPATIENT
Start: 2024-04-04

## 2024-04-06 LAB
T4 FREE SERPL-MCNC: 1.82 NG/DL (ref 0.82–1.77)
TSH SERPL DL<=0.005 MIU/L-ACNC: 1.53 UIU/ML (ref 0.45–4.5)

## 2024-04-08 ENCOUNTER — OFFICE VISIT (OUTPATIENT)
Age: 74
End: 2024-04-08
Payer: MEDICARE

## 2024-04-08 VITALS
HEIGHT: 62 IN | WEIGHT: 200 LBS | HEART RATE: 81 BPM | SYSTOLIC BLOOD PRESSURE: 100 MMHG | BODY MASS INDEX: 36.8 KG/M2 | DIASTOLIC BLOOD PRESSURE: 67 MMHG

## 2024-04-08 DIAGNOSIS — E89.0 POST-SURGICAL HYPOTHYROIDISM: Primary | ICD-10-CM

## 2024-04-08 PROCEDURE — 1123F ACP DISCUSS/DSCN MKR DOCD: CPT | Performed by: INTERNAL MEDICINE

## 2024-04-08 PROCEDURE — G8417 CALC BMI ABV UP PARAM F/U: HCPCS | Performed by: INTERNAL MEDICINE

## 2024-04-08 PROCEDURE — 3017F COLORECTAL CA SCREEN DOC REV: CPT | Performed by: INTERNAL MEDICINE

## 2024-04-08 PROCEDURE — 1036F TOBACCO NON-USER: CPT | Performed by: INTERNAL MEDICINE

## 2024-04-08 PROCEDURE — G8427 DOCREV CUR MEDS BY ELIG CLIN: HCPCS | Performed by: INTERNAL MEDICINE

## 2024-04-08 PROCEDURE — 1090F PRES/ABSN URINE INCON ASSESS: CPT | Performed by: INTERNAL MEDICINE

## 2024-04-08 PROCEDURE — 99213 OFFICE O/P EST LOW 20 MIN: CPT | Performed by: INTERNAL MEDICINE

## 2024-04-08 PROCEDURE — G8400 PT W/DXA NO RESULTS DOC: HCPCS | Performed by: INTERNAL MEDICINE

## 2024-04-08 NOTE — PATIENT INSTRUCTIONS
1) TSH is a thyroid test.  Your level is 1.5 which is normal and at goal of 0.45-2.0.  The TSH goes opposite of your thyroid dose and suggests your dose of synthroid 6.5 tabs/week is perfect so I will keep your dose the same.    2) Your free T4 was slightly high at 1.82 but I'm not concerned about this as often this will go up if you have taken your thyroid pill within 12 hours of having your labs drawn.    3) I will plan on seeing you back in 6 months but if you feel you need to have your labs checked sooner, please let me know.

## 2024-04-08 NOTE — PROGRESS NOTES
Chief Complaint   Patient presents with    Thyroid Problem     PCP and pharmacy confirmed    Other     Defibrillator placed in 02/2024     History of Present Illness: Alexa Ozuna is a 73 y.o. female here for follow up of thyroid.  Weight down 1 lbs since last visit in 10/23.  Has been taking 6.5 tabs/week of synthroid since Jan 2024 and takes every morning around 7 am and waits at least 1-2 hours before any food and doesn't drink coffee.  Had a defibrillator placed in 2/24.  Was prescribed farxiga by her cardiologist prior to her defibrillator placement but did not start this until after the placement and only took 1 dose and had a lot of side effects so she didn't continue this.  No chest pain or palpitations.  Bowels are regular.  Has had issues with loss of taste since 1/24.  Her leg swelling has been better controlled on bumex 2 tabs daily but still has a fair bit today though she didn't take her pill today.      Current Outpatient Medications   Medication Sig    levothyroxine (SYNTHROID) 112 MCG tablet TAKE 1 TABLET DAILY BEFORE BREAKFAST ON MON-SAT AND 1/2 TAB ON SUN--DAW5 DISPENSE BRAND SYNTHROID AS GENERIC    bumetanide (BUMEX) 2 MG tablet 2 tablets daily    sacubitril-valsartan (ENTRESTO) 24-26 MG per tablet Take 0.5 tablets by mouth 2 times daily    POTASSIUM CHLORIDE PO Take by mouth 20 meq 1 tab daily    VITAMIN D, CHOLECALCIFEROL, PO Take by mouth Once daily    vitamin E 400 UNIT capsule Take 1 capsule by mouth daily    Ascorbic Acid (VITAMIN C PO) Take by mouth daily    apixaban (ELIQUIS) 5 MG TABS tablet Take 1 tab twice daily     No current facility-administered medications for this visit.     Allergies   Allergen Reactions    Adhesive Tape Other (See Comments)    Guaifenesin Other (See Comments)     Put her into A-fib    Phenazopyridine      Other reaction(s): Unknown (comments)    Sulfamethoxazole-Trimethoprim Swelling       Review of Systems: PER HPI    Physical Examination:  Blood pressure

## 2024-07-17 NOTE — TELEPHONE ENCOUNTER
Pt was notified of message per Dr Anahy Schuster and she voiced understanding of what was read to her. Name And Contact Information For Health Care Proxy: Myla [id number] [phone] Detail Level: Detailed Quality 226: Preventive Care And Screening: Tobacco Use: Screening And Cessation Intervention: Patient screened for tobacco use and is an ex/non-smoker

## 2024-07-18 ENCOUNTER — TELEPHONE (OUTPATIENT)
Age: 74
End: 2024-07-18

## 2024-07-18 DIAGNOSIS — E89.0 POST-SURGICAL HYPOTHYROIDISM: Primary | ICD-10-CM

## 2024-07-18 NOTE — TELEPHONE ENCOUNTER
We had the following [employer] exchange:    I received this message yesterday and your phone call today and have been tied up in the clinic so thanks for your patience.  Although your thyroid tests were normal when checked on 5/21/24 and it's unlikely the shortness of breath is from your thyroid, I'm happy to repeat your labs now just to be safe.  -------------------------------------------------------------------------------------------------------------------  I put an order directly into the [employer] system to repeat your labs now so just ask for the order under my name and I will be in touch with the results.    I will let you know that I will be out of the office starting Friday 7/19/24 at 6 pm and won't be back until Monday 7/29/24 at 8:30am so if you write to me while I'm away, I will respond to your message when I get a chance sometime during the week after I return.  My partners will be covering for me while I'm away if something urgent is needed.    ===View-only below this line===      ----- Message -----       From:Alexa Ozuna       Sent:7/17/2024  4:12 AM EDT         To:Dr. Ray Sam    Subject:Thyroid test    I need to have thyroid labs taken again.  I've been experiencing shortness of breath again.  Had my lungs checked today and lungs are clear.  Just trying to see what's causing the shortness of breath and shakiness.  Thank you.

## 2024-07-18 NOTE — TELEPHONE ENCOUNTER
Pt LVM stating she sent a [employer] message regarding possible labs and asked what should she do.

## 2024-09-24 DIAGNOSIS — E89.0 POST-SURGICAL HYPOTHYROIDISM: ICD-10-CM

## 2024-10-04 LAB
T4 FREE SERPL-MCNC: 2 NG/DL (ref 0.82–1.77)
TSH SERPL DL<=0.005 MIU/L-ACNC: 0.21 UIU/ML (ref 0.45–4.5)

## 2024-10-08 ENCOUNTER — OFFICE VISIT (OUTPATIENT)
Age: 74
End: 2024-10-08
Payer: MEDICARE

## 2024-10-08 VITALS
SYSTOLIC BLOOD PRESSURE: 100 MMHG | DIASTOLIC BLOOD PRESSURE: 58 MMHG | WEIGHT: 187.2 LBS | HEIGHT: 62 IN | BODY MASS INDEX: 34.45 KG/M2 | HEART RATE: 80 BPM

## 2024-10-08 DIAGNOSIS — E89.0 POST-SURGICAL HYPOTHYROIDISM: Primary | ICD-10-CM

## 2024-10-08 PROCEDURE — 1090F PRES/ABSN URINE INCON ASSESS: CPT | Performed by: INTERNAL MEDICINE

## 2024-10-08 PROCEDURE — G8400 PT W/DXA NO RESULTS DOC: HCPCS | Performed by: INTERNAL MEDICINE

## 2024-10-08 PROCEDURE — G8427 DOCREV CUR MEDS BY ELIG CLIN: HCPCS | Performed by: INTERNAL MEDICINE

## 2024-10-08 PROCEDURE — G2211 COMPLEX E/M VISIT ADD ON: HCPCS | Performed by: INTERNAL MEDICINE

## 2024-10-08 PROCEDURE — 1036F TOBACCO NON-USER: CPT | Performed by: INTERNAL MEDICINE

## 2024-10-08 PROCEDURE — 1123F ACP DISCUSS/DSCN MKR DOCD: CPT | Performed by: INTERNAL MEDICINE

## 2024-10-08 PROCEDURE — G8484 FLU IMMUNIZE NO ADMIN: HCPCS | Performed by: INTERNAL MEDICINE

## 2024-10-08 PROCEDURE — 3017F COLORECTAL CA SCREEN DOC REV: CPT | Performed by: INTERNAL MEDICINE

## 2024-10-08 PROCEDURE — G8417 CALC BMI ABV UP PARAM F/U: HCPCS | Performed by: INTERNAL MEDICINE

## 2024-10-08 PROCEDURE — 99214 OFFICE O/P EST MOD 30 MIN: CPT | Performed by: INTERNAL MEDICINE

## 2024-10-08 RX ORDER — LEVOTHYROXINE SODIUM 112 UG/1
TABLET ORAL
Qty: 90 TABLET | Refills: 3 | Status: SHIPPED | OUTPATIENT
Start: 2024-10-08

## 2024-10-08 NOTE — PATIENT INSTRUCTIONS
1) TSH is a thyroid test.  Your level is 0.21 which is low and below goal of 0.45-2.0.  The TSH goes opposite of your thyroid dose and suggests your dose of synthroid is more than you need after losing 13 lbs on our scales.  I will decrease your dose to 1 tab on Mon-Sat and NONE on Sun.  I updated your med list but did not send a new prescription to your pharmacy on file that has been filling this med.  Tomorrow also plan on skipping your dose.    2) Please repeat your labs in 6-8 weeks.   I will send you a message through [employer] with your lab results.    3) If Dr. Peters checks your thyroid over the next 6 months, just let me know the results and I can adjust your dose if needed.

## 2024-10-08 NOTE — PROGRESS NOTES
Chief Complaint   Patient presents with    Thyroid Problem     PCP and pharmacy confirmed     History of Present Illness: Alexa Ozuna is a 73 y.o. female here for follow up of thyroid.  Weight down 13 lbs since last visit in 4/24.  Compliant with synthroid 6.5 tabs/week and has lost under 180 lbs on her scales.  Thinks her fluid is doing better on the bumex and entresto.  No chest pain or palpitations.  Does get some occasional tremors if she is rushing but otherwise not or if she hasn't eaten.  Has noticed feeling hotter especially at night.  Bowels are regular. No hair loss or brittle nails.  Does have some fatigue but better than before as her heart has been addressed.  Trying to do some walking.        Current Outpatient Medications   Medication Sig    levothyroxine (SYNTHROID) 112 MCG tablet TAKE 1 TABLET DAILY BEFORE BREAKFAST ON MON-SAT AND NONE ON SUN--DAW5 DISPENSE BRAND SYNTHROID AS GENERIC--Dose change 10/08/24--updated med list--did not send prescription to the pharmacy    bumetanide (BUMEX) 2 MG tablet 2 tablets daily    sacubitril-valsartan (ENTRESTO) 24-26 MG per tablet Take 0.5 tablets by mouth 2 times daily    POTASSIUM CHLORIDE PO Take by mouth 20 meq 1 tab daily    VITAMIN D, CHOLECALCIFEROL, PO Take by mouth Once daily    vitamin E 400 UNIT capsule Take 1 capsule by mouth daily    Ascorbic Acid (VITAMIN C PO) Take by mouth daily    apixaban (ELIQUIS) 5 MG TABS tablet Take 1 tab twice daily     No current facility-administered medications for this visit.     Allergies   Allergen Reactions    Adhesive Tape Other (See Comments)    Guaifenesin Other (See Comments)     Put her into A-fib    Phenazopyridine      Other reaction(s): Unknown (comments)    Sulfamethoxazole-Trimethoprim Swelling       Review of Systems: PER HPI    Physical Examination:  Blood pressure (!) 100/58, pulse 80, height 1.575 m (5' 2\"), weight 84.9 kg (187 lb 3.2 oz).  General: pleasant, no distress, good eye contact   Neck: no

## 2024-11-19 DIAGNOSIS — E89.0 POST-SURGICAL HYPOTHYROIDISM: ICD-10-CM

## 2024-11-23 LAB
T4 FREE SERPL-MCNC: 2.06 NG/DL (ref 0.82–1.77)
TSH SERPL DL<=0.005 MIU/L-ACNC: 1.37 UIU/ML (ref 0.45–4.5)

## 2025-01-29 DIAGNOSIS — E89.0 POST-SURGICAL HYPOTHYROIDISM: Primary | ICD-10-CM

## 2025-02-01 LAB
T4 FREE SERPL-MCNC: 1.87 NG/DL (ref 0.82–1.77)
TSH SERPL DL<=0.005 MIU/L-ACNC: 0.35 UIU/ML (ref 0.45–4.5)

## 2025-02-28 DIAGNOSIS — E89.0 POST-SURGICAL HYPOTHYROIDISM: Primary | ICD-10-CM

## 2025-03-04 LAB
T4 FREE SERPL-MCNC: 1.49 NG/DL (ref 0.82–1.77)
TSH SERPL DL<=0.005 MIU/L-ACNC: 2.42 UIU/ML (ref 0.45–4.5)

## 2025-03-05 ENCOUNTER — TELEPHONE (OUTPATIENT)
Age: 75
End: 2025-03-05

## 2025-03-05 NOTE — TELEPHONE ENCOUNTER
Pt LVM stating she waned to know if Dr Sam has received her MyChart message and asked what to do about her medication.

## 2025-03-05 NOTE — TELEPHONE ENCOUNTER
Sent her the following message through [employer]:    TSH is a thyroid test.  Your level is 2.42 which is normal but just above my goal of 0.45-2.0.  The TSH goes opposite of your thyroid dose and suggests your dose of synthroid 5.5 tabs/week is not quite enough.  I will increase your dose back to 1 tab 6 days a week and skip Sun.  I updated your med list but did not send a new prescription to your pharmacy on file that has been filling this med.  Please repeat your labs again a few days before your next visit with me on 4/11/25.

## 2025-03-28 DIAGNOSIS — E89.0 POST-SURGICAL HYPOTHYROIDISM: ICD-10-CM

## 2025-04-07 RX ORDER — LEVOTHYROXINE SODIUM 112 UG/1
TABLET ORAL
Qty: 90 TABLET | Refills: 3 | Status: SHIPPED | OUTPATIENT
Start: 2025-04-07 | End: 2025-04-11 | Stop reason: DRUGHIGH

## 2025-04-09 LAB
T4 FREE SERPL-MCNC: 1.76 NG/DL (ref 0.82–1.77)
TSH SERPL DL<=0.005 MIU/L-ACNC: 0.2 UIU/ML (ref 0.45–4.5)

## 2025-04-11 ENCOUNTER — RESULTS FOLLOW-UP (OUTPATIENT)
Age: 75
End: 2025-04-11

## 2025-04-11 ENCOUNTER — OFFICE VISIT (OUTPATIENT)
Age: 75
End: 2025-04-11
Payer: MEDICARE

## 2025-04-11 VITALS
BODY MASS INDEX: 34.78 KG/M2 | SYSTOLIC BLOOD PRESSURE: 115 MMHG | HEART RATE: 82 BPM | WEIGHT: 189 LBS | DIASTOLIC BLOOD PRESSURE: 60 MMHG | HEIGHT: 62 IN

## 2025-04-11 DIAGNOSIS — E89.0 POST-SURGICAL HYPOTHYROIDISM: Primary | ICD-10-CM

## 2025-04-11 PROCEDURE — 1159F MED LIST DOCD IN RCRD: CPT | Performed by: INTERNAL MEDICINE

## 2025-04-11 PROCEDURE — 99214 OFFICE O/P EST MOD 30 MIN: CPT | Performed by: INTERNAL MEDICINE

## 2025-04-11 PROCEDURE — G2211 COMPLEX E/M VISIT ADD ON: HCPCS | Performed by: INTERNAL MEDICINE

## 2025-04-11 PROCEDURE — 1123F ACP DISCUSS/DSCN MKR DOCD: CPT | Performed by: INTERNAL MEDICINE

## 2025-04-11 PROCEDURE — 1036F TOBACCO NON-USER: CPT | Performed by: INTERNAL MEDICINE

## 2025-04-11 PROCEDURE — G8427 DOCREV CUR MEDS BY ELIG CLIN: HCPCS | Performed by: INTERNAL MEDICINE

## 2025-04-11 PROCEDURE — 1090F PRES/ABSN URINE INCON ASSESS: CPT | Performed by: INTERNAL MEDICINE

## 2025-04-11 PROCEDURE — G8400 PT W/DXA NO RESULTS DOC: HCPCS | Performed by: INTERNAL MEDICINE

## 2025-04-11 PROCEDURE — 1160F RVW MEDS BY RX/DR IN RCRD: CPT | Performed by: INTERNAL MEDICINE

## 2025-04-11 PROCEDURE — 1126F AMNT PAIN NOTED NONE PRSNT: CPT | Performed by: INTERNAL MEDICINE

## 2025-04-11 PROCEDURE — G8417 CALC BMI ABV UP PARAM F/U: HCPCS | Performed by: INTERNAL MEDICINE

## 2025-04-11 RX ORDER — LEVOTHYROXINE SODIUM 112 UG/1
TABLET ORAL
Qty: 90 TABLET | Refills: 3 | Status: SHIPPED | OUTPATIENT
Start: 2025-04-11

## 2025-04-11 NOTE — PATIENT INSTRUCTIONS
1) TSH is a thyroid test.  Your level is 0.19 which is slightly low and below goal of 0.45-2.0.  The TSH goes opposite of your thyroid dose and suggests your dose of synthroid is slightly more than you need.  I will decrease your dose to 1 tab 6 days a week and still skip Sun AND skip the 15th of each month (or a day as close to this as possible).  I updated your med list but did not send a new prescription to your pharmacy on file that has been filling this med.    2) Do this for April and May and then repeat your labs after Memorial Day.   I will send you a message through [employer] with your lab results.    3) If you feel you need your labs sooner than October, then let me know.

## 2025-04-11 NOTE — PROGRESS NOTES
that her thyroid was enlarged and was sent to Dr. Marrero and was watched for a year and apparently her thyroid was enlarging so the decision was made to remove it.  Thinks her thyroid was overactive prior to the surgery as she was very thin at that time and had lots of energy but was never put on any meds for hyperthyroidism.  The pathology was benign and was put on medication after the surgery and was seen by Dr. Bass and was managed with synthroid the whole time.  About 7 years ago was told by her nutrition company to do a trial of natural thyroid and recalls taking armour thyroid and very dry mouth while on this so switched to nature-throid and tolerated this better and stayed on for about 2 years.  Recalls being put on cytomel at that point and developed a lot of tremors and then went back to see her PCP, Dr. Adhikari, and was put back on synthroid 88 mcg daily and then started seeing Dr. Stephens and her dose was increased to 100 mcg daily and has remained on this dose since Nov 2018.  TSH was 1.86 in 11/18 and 1.08 in 1/19 and 2.05 in 6/19 and FT4 was 1.46 in 6/19.  TSH 1.66 in 9/19 so kept dose the same.  TSH up to 2.15 in 11/19 so increased dose to 7.5 tabs/week and then in 1/20 was still having fatigue so changed to the synthroid 112 mcg dose and TSH 0.81 in 3/20 and felt well so kept dose the same.  TSH 1.59 in 10/20 and increased to 125 mcg daily and TSH slightly low at 0.41 in 3/21 and developed a-fib and was started on amiodarone so decreased to 6.5 tabs/week.  TSH 1.27 in 5/21 and 0.909 in 9/21 and 1.81 in 1/22 and 0.79 in 6/22 so kept dose the same.  TSH 0.39 and FT4 1.79 in 9/22 so decreased to 112 mcg daily and TSH 0.75 in 11/22 and 0.63 in 3/23 so kept dose the same.  TSH 0.25 and FT4 1.84 in 6/23 so decreased to 6.5 tabs/week and TSH 0.79 and FT4 2.06 in 8/23 so decreased to 6 tabs/week and TSH 0.75 and FT4 1.4 in 9/23 so kept dose the same.  TSH 1.74 in 12/23 so kept dose the same.  TSH up to

## 2025-05-28 DIAGNOSIS — E89.0 POST-SURGICAL HYPOTHYROIDISM: ICD-10-CM

## 2025-06-13 LAB
T4 FREE SERPL-MCNC: 1.93 NG/DL (ref 0.82–1.77)
TSH SERPL DL<=0.005 MIU/L-ACNC: 0.64 UIU/ML (ref 0.45–4.5)

## 2025-06-14 ENCOUNTER — RESULTS FOLLOW-UP (OUTPATIENT)
Age: 75
End: 2025-06-14

## 2025-07-08 ENCOUNTER — TELEPHONE (OUTPATIENT)
Age: 75
End: 2025-07-08

## 2025-07-08 DIAGNOSIS — E89.0 POST-SURGICAL HYPOTHYROIDISM: Primary | ICD-10-CM

## 2025-07-08 NOTE — TELEPHONE ENCOUNTER
We had the following [employer] exchange:    I did not get any other [employer] message but this one and didn't get a chance to reply yesterday and also received your message today but have been swamped in clinic the past 2 days.  I'm happy to repeat your thyroid levels just to make sure they're ok.  -------------------------------------------------------------------------------------------------------------------  I put an order directly into the [employer] system to repeat your labs now so just ask for the order under my name and I will be in touch with the results.  Take care and hope you had a Happy July 4th!    ===View-only below this line===      ----- Message -----       From:Alexa Ozuna       Sent:7/7/2025 11:27 AM EDT         To:Dr. Ray Sam MD    Subject:Need blood work done     I'm sending this message began because my first message shows as a \"draft\" and I don't know if you got it.    I need to get my blood work done again.  I'm having some issues and not sure if it's my thyroid or not.

## 2025-07-08 NOTE — TELEPHONE ENCOUNTER
Spoke with patient. She would like to have more blood work done on her thyroid. She is aware it hasn't been a month since her last labs were drawn. She's having hot flashes at night and weight gain with a couple other things but wouldn't go into detail.

## 2025-07-12 LAB
T4 FREE SERPL-MCNC: 1.98 NG/DL (ref 0.82–1.77)
TSH SERPL DL<=0.005 MIU/L-ACNC: 0.35 UIU/ML (ref 0.45–4.5)

## 2025-08-23 DIAGNOSIS — E89.0 POST-SURGICAL HYPOTHYROIDISM: ICD-10-CM

## 2025-08-23 LAB
T4 FREE SERPL-MCNC: 1.72 NG/DL (ref 0.82–1.77)
TSH SERPL DL<=0.005 MIU/L-ACNC: 0.61 UIU/ML (ref 0.45–4.5)

## (undated) DEVICE — ESOPHAGEAL BALLOON DILATATION CATHETER: Brand: CRE FIXED WIRE

## (undated) DEVICE — TRAP SURG QUAD PARABOLA SLOT DSGN SFTY SCRN TRAPEASE

## (undated) DEVICE — SNARE ENDOSCP L240CM LOOP W27MM SHTH DIA2.4MM WRK CHN 2.8MM

## (undated) DEVICE — REM POLYHESIVE ADULT PATIENT RETURN ELECTRODE: Brand: VALLEYLAB

## (undated) DEVICE — TUBING HYDR IRR --

## (undated) DEVICE — FORCEPS BX L240CM JAW DIA2.8MM L CAP W/ NDL MIC MESH TOOTH

## (undated) DEVICE — SYR ASSEMB INFL BLLN 60ML --